# Patient Record
Sex: FEMALE | Race: WHITE | NOT HISPANIC OR LATINO | Employment: UNEMPLOYED | URBAN - METROPOLITAN AREA
[De-identification: names, ages, dates, MRNs, and addresses within clinical notes are randomized per-mention and may not be internally consistent; named-entity substitution may affect disease eponyms.]

---

## 2017-03-20 ENCOUNTER — HISTORICAL (OUTPATIENT)
Dept: ADMINISTRATIVE | Facility: HOSPITAL | Age: 32
End: 2017-03-20

## 2019-09-15 ENCOUNTER — OFFICE VISIT (OUTPATIENT)
Dept: URGENT CARE | Facility: CLINIC | Age: 34
End: 2019-09-15
Payer: MEDICAID

## 2019-09-15 VITALS
DIASTOLIC BLOOD PRESSURE: 68 MMHG | HEIGHT: 64 IN | SYSTOLIC BLOOD PRESSURE: 110 MMHG | BODY MASS INDEX: 30.87 KG/M2 | HEART RATE: 68 BPM | RESPIRATION RATE: 18 BRPM | WEIGHT: 180.81 LBS | TEMPERATURE: 97 F

## 2019-09-15 DIAGNOSIS — S30.814A ABRASION OF VAGINA, INITIAL ENCOUNTER: Primary | ICD-10-CM

## 2019-09-15 LAB
B-HCG UR QL: NEGATIVE
BILIRUB SERPL-MCNC: NEGATIVE MG/DL
BLOOD URINE, POC: NEGATIVE
COLOR, POC UA: NORMAL
CTP QC/QA: YES
GLUCOSE UR QL STRIP: NEGATIVE
KETONES UR QL STRIP: NEGATIVE
LEUKOCYTE ESTERASE URINE, POC: NEGATIVE
NITRITE, POC UA: NEGATIVE
PH, POC UA: 5
PROTEIN, POC: NEGATIVE
SPECIFIC GRAVITY, POC UA: 1.01
UROBILINOGEN, POC UA: NORMAL

## 2019-09-15 PROCEDURE — 99204 PR OFFICE/OUTPT VISIT, NEW, LEVL IV, 45-59 MIN: ICD-10-PCS | Mod: 25,S$GLB,, | Performed by: NURSE PRACTITIONER

## 2019-09-15 PROCEDURE — 81003 PR URINALYSIS, AUTO, W/O SCOPE: ICD-10-PCS | Mod: QW,S$GLB,, | Performed by: NURSE PRACTITIONER

## 2019-09-15 PROCEDURE — 99204 OFFICE O/P NEW MOD 45 MIN: CPT | Mod: 25,S$GLB,, | Performed by: NURSE PRACTITIONER

## 2019-09-15 PROCEDURE — 81025 POCT URINE PREGNANCY: ICD-10-PCS | Mod: S$GLB,,, | Performed by: NURSE PRACTITIONER

## 2019-09-15 PROCEDURE — 81025 URINE PREGNANCY TEST: CPT | Mod: S$GLB,,, | Performed by: NURSE PRACTITIONER

## 2019-09-15 PROCEDURE — 81003 URINALYSIS AUTO W/O SCOPE: CPT | Mod: QW,S$GLB,, | Performed by: NURSE PRACTITIONER

## 2019-09-15 RX ORDER — 1.1% SODIUM FLUORIDE PRESCRIPTION DENTAL CREAM 5 MG/G
CREAM DENTAL
Refills: 0 | COMMUNITY
Start: 2019-08-26

## 2019-09-15 RX ORDER — SULFAMETHOXAZOLE AND TRIMETHOPRIM 800; 160 MG/1; MG/1
1 TABLET ORAL 2 TIMES DAILY
Qty: 20 TABLET | Refills: 0 | Status: SHIPPED | OUTPATIENT
Start: 2019-09-15 | End: 2023-10-10

## 2019-09-15 RX ORDER — MUPIROCIN 20 MG/G
OINTMENT TOPICAL
Qty: 22 G | Refills: 1 | OUTPATIENT
Start: 2019-09-15 | End: 2023-04-29

## 2019-09-15 NOTE — PATIENT INSTRUCTIONS
Abrasions  Abrasions are skin scrapes. Their treatment depends on how large and deep the abrasion is.  Home care  You may be prescribed an antibiotic cream or ointment to apply to the wound. This helps prevent infection. Follow instructions when using this medicine.  General care  · To care for the abrasion, do the following each day for as long as directed by your healthcare provider.  ¨ If you were given a bandage, change it once a day. If your bandage sticks to the wound, soak it in warm water until it loosens.  ¨ Wash the area with soap and warm water. You may do this in a sink or under a tub faucet or shower. Rinse off the soap. Then pat the area dry with a clean towel.  ¨ If antibiotic ointment or cream was prescribed, reapply it to the wound as directed. Cover the wound with a fresh nonstick bandage. If the bandage becomes wet or dirty, change it as soon as possible.  ¨ Some antibiotic ointments or cream can cause an allergic reaction or dermatitis. This may cause redness, itching and or hives. If this occurs, stop using the ointment immediately and wash off any remaining ointment. You may need to take some allergy medicine to relieve symptoms.  · You may use acetaminophen or ibuprofen to control pain unless another pain medicine was prescribed. Talk with your healthcare provider before using these medicines if you have chronic liver or kidney disease or ever had a stomach ulcer or GI bleeding. Dont use ibuprofen in children younger than six months old.  · Most skin wounds heal within 10 days. But an infection may occur even with treatment. So its important to watch the wound for signs of infection as listed below.  Follow-up care  Follow up with your healthcare provider, or as advised.  When to seek medical advice  Call your healthcare provider right away if any of these occur:  · Fever of 100.4ºF (38ºC) or higher, or as directed by your healthcare provider  · Increasing pain, redness, swelling, or  drainage from the wound  · Bleeding from the wound that does not stop after a few minutes of steady, firm pressure  · Decreased ability to move any body part near the wound  Date Last Reviewed: 3/3/2017  © 2557-3537 [employer] [address] [place] [address], [place] [address]. All rights reserved. This information is not intended as a substitute for professional medical care. Always follow your healthcare professional's instructions.

## 2019-09-15 NOTE — PROGRESS NOTES
"Subjective:       Patient ID: Lakshmi Rodriguez is a 34 y.o. female.    Vitals:  height is 5' 4" (1.626 m) and weight is 82 kg (180 lb 12.8 oz). Her oral temperature is 97.2 °F (36.2 °C). Her blood pressure is 110/68 and her pulse is 68. Her respiration is 18.     Chief Complaint: Sore (sores in groin area) and Urinary Frequency    Pt presents with pain and lesion to vaginal area. Pt states for the past 3 days she has been experiencing pain with lesion to right vaginal area. She reports shaving area prior to symptoms starting. Pt is sexually active with only1 partner and denies concern for STD. Pt denies vaginal discharge. Pt reports pain to area with urination. Pt denies f/c/n/v.       Constitution: Negative for chills, fatigue and fever.   HENT: Negative for congestion and sore throat.    Neck: Negative for painful lymph nodes.   Cardiovascular: Negative for chest pain and leg swelling.   Eyes: Negative for double vision and blurred vision.   Respiratory: Negative for cough and shortness of breath.    Gastrointestinal: Negative for nausea, vomiting and diarrhea.   Genitourinary: Positive for vaginal pain and genital sore. Negative for dysuria, frequency, urgency, history of kidney stones, vaginal discharge, vaginal bleeding, vaginal odor, painful intercourse, painful ejaculation and pelvic pain.   Musculoskeletal: Negative for joint pain, joint swelling, muscle cramps and muscle ache.   Skin: Negative for color change, pale, rash and bruising.   Allergic/Immunologic: Negative for seasonal allergies.   Neurological: Negative for dizziness, history of vertigo, light-headedness, passing out and headaches.   Hematologic/Lymphatic: Negative for swollen lymph nodes.   Psychiatric/Behavioral: Negative for nervous/anxious, sleep disturbance and depression. The patient is not nervous/anxious.        Objective:      Physical Exam   Constitutional: She is oriented to person, place, and time. She appears well-developed and " well-nourished. She is cooperative.  Non-toxic appearance. She does not appear ill. No distress.   HENT:   Head: Normocephalic and atraumatic.   Right Ear: Hearing, tympanic membrane, external ear and ear canal normal.   Left Ear: Hearing, tympanic membrane, external ear and ear canal normal.   Nose: Nose normal. No mucosal edema, rhinorrhea or nasal deformity. No epistaxis. Right sinus exhibits no maxillary sinus tenderness and no frontal sinus tenderness. Left sinus exhibits no maxillary sinus tenderness and no frontal sinus tenderness.   Mouth/Throat: Uvula is midline, oropharynx is clear and moist and mucous membranes are normal. No trismus in the jaw. Normal dentition. No uvula swelling. No posterior oropharyngeal erythema.   Eyes: Conjunctivae and lids are normal. Right eye exhibits no discharge. Left eye exhibits no discharge. No scleral icterus.   Sclera clear bilat   Neck: Trachea normal, normal range of motion, full passive range of motion without pain and phonation normal. Neck supple.   Cardiovascular: Normal rate, regular rhythm, normal heart sounds, intact distal pulses and normal pulses.   Pulmonary/Chest: Effort normal and breath sounds normal. No respiratory distress.   Abdominal: Soft. Normal appearance and bowel sounds are normal. She exhibits no distension, no pulsatile midline mass and no mass. There is no tenderness.   Genitourinary: No vaginal discharge found.   Genitourinary Comments: 2 small superficial wounds to right labia majora with mild erythema. No herpetic lesion.    Musculoskeletal: Normal range of motion. She exhibits no edema or deformity.   Neurological: She is alert and oriented to person, place, and time. She exhibits normal muscle tone. Coordination normal.   Skin: Skin is warm, dry and intact. She is not diaphoretic. No pallor.   Psychiatric: She has a normal mood and affect. Her speech is normal and behavior is normal. Judgment and thought content normal. Cognition and memory  are normal.   Nursing note and vitals reviewed.      Assessment:       1. Abrasion of vagina, initial encounter        Plan:         Abrasion of vagina, initial encounter  -     POCT URINE DIPSTICK WITHOUT MICROSCOPE  -     POCT urine pregnancy    Other orders  -     sulfamethoxazole-trimethoprim 800-160mg (BACTRIM DS) 800-160 mg Tab; Take 1 tablet by mouth 2 (two) times daily.  Dispense: 20 tablet; Refill: 0  -     mupirocin (BACTROBAN) 2 % ointment; Apply to affected area 3 times daily  Dispense: 22 g; Refill: 1

## 2019-12-09 ENCOUNTER — HOSPITAL ENCOUNTER (EMERGENCY)
Facility: HOSPITAL | Age: 34
Discharge: HOME | End: 2019-12-09
Attending: EMERGENCY MEDICINE
Payer: MEDICAID

## 2019-12-09 VITALS
TEMPERATURE: 98 F | BODY MASS INDEX: 27.32 KG/M2 | WEIGHT: 164 LBS | SYSTOLIC BLOOD PRESSURE: 118 MMHG | OXYGEN SATURATION: 98 % | HEIGHT: 65 IN | RESPIRATION RATE: 17 BRPM | DIASTOLIC BLOOD PRESSURE: 72 MMHG | HEART RATE: 84 BPM

## 2019-12-09 DIAGNOSIS — M25.532 LEFT WRIST PAIN: Primary | ICD-10-CM

## 2019-12-09 DIAGNOSIS — R52 PAIN: ICD-10-CM

## 2019-12-09 LAB
B-HCG UR QL: NEGATIVE
CTP QC/QA: YES

## 2019-12-09 PROCEDURE — 99283 EMERGENCY DEPT VISIT LOW MDM: CPT | Mod: 25

## 2019-12-09 PROCEDURE — 81025 URINE PREGNANCY TEST: CPT | Performed by: NURSE PRACTITIONER

## 2019-12-09 RX ORDER — DICLOFENAC SODIUM 50 MG/1
50 TABLET, DELAYED RELEASE ORAL 3 TIMES DAILY PRN
Qty: 20 TABLET | Refills: 2 | Status: SHIPPED | OUTPATIENT
Start: 2019-12-09

## 2019-12-09 NOTE — ED PROVIDER NOTES
Encounter Date: 12/9/2019       History     Chief Complaint   Patient presents with    Hand Pain     Left     Left wrist pain with mild swelling  ONset PTA  Pt was at work picked something up and felt a pop  NOw has swelling in this area        Review of patient's allergies indicates:  No Known Allergies  Past Medical History:   Diagnosis Date    Seizures     epilepsy absent seizures     Past Surgical History:   Procedure Laterality Date    EAR RECONSTRUCTION      bilateral ears, r/t birth defect     Family History   Problem Relation Age of Onset    Diabetes Maternal Grandmother     Heart disease Maternal Grandmother     Mental retardation Cousin      Social History     Tobacco Use    Smoking status: Never Smoker   Substance Use Topics    Alcohol use: No     Comment: social drinker when not pregnant    Drug use: No     Review of Systems   Constitutional: Negative for fever.   Respiratory: Negative for cough, shortness of breath and wheezing.    Cardiovascular: Negative for chest pain, palpitations and leg swelling.   Gastrointestinal: Negative for abdominal pain, diarrhea, nausea and vomiting.   Genitourinary: Negative for dysuria.   Musculoskeletal: Negative for back pain.        Left wrist pain and swelling   Skin: Negative for rash.   Neurological: Negative for weakness.       Physical Exam     Initial Vitals [12/09/19 1007]   BP Pulse Resp Temp SpO2   128/72 82 16 98.4 °F (36.9 °C) 98 %      MAP       --         Physical Exam    Constitutional: She appears well-developed and well-nourished.   HENT:   Head: Normocephalic and atraumatic.   Eyes: Conjunctivae are normal.   Neck: Normal range of motion.   Cardiovascular: Normal rate and regular rhythm.   Pulmonary/Chest: Breath sounds normal. No respiratory distress.   Musculoskeletal: Normal range of motion.   Left wrist with area of mild swelling and tenderness.  Has full ROM all extremities.     Neurological: She is alert and oriented to person, place,  and time. No sensory deficit. GCS score is 15. GCS eye subscore is 4. GCS verbal subscore is 5. GCS motor subscore is 6.   Skin: Skin is warm and dry.   Psychiatric: She has a normal mood and affect. Thought content normal.         ED Course   Splint Application  Date/Time: 12/9/2019 11:12 AM  Performed by: Sandy Paredes NP  Authorized by: Kimi Spencer MD   Location details: left wrist  Supplies used: elastic bandage  Post-procedure: The splinted body part was neurovascularly unchanged following the procedure.  Patient tolerance: Patient tolerated the procedure well with no immediate complications  Comments: Wrist splint applied        Labs Reviewed   POCT URINE PREGNANCY          Imaging Results          X-Ray Wrist Complete Left (Final result)  Result time 12/09/19 10:27:40    Final result by Kennedy Jones IV, MD (12/09/19 10:27:40)                 Impression:      No acute osseous abnormality.      Electronically signed by: Kennedy Jones IV., MD  Date:    12/09/2019  Time:    10:27             Narrative:    EXAMINATION:  XR WRIST COMPLETE 3 VIEWS LEFT    CLINICAL HISTORY:  Pain, unspecified    COMPARISON:  None.    FINDINGS:  The bones are appropriately mineralized.  No acute fracture, dislocation or osseous destruction is observed. The joint are spaces appropriately maintained.  No focal soft tissue abnormality is identified.                                 Medical Decision Making:   Initial Assessment:   Left wrist pain and swelling  Have discussed this pt with DR. Spencer                                 Clinical Impression:       ICD-10-CM ICD-9-CM   1. Left wrist pain M25.532 719.43   2. Pain R52 780.96                             Sandy Paredes NP  12/09/19 1113

## 2020-03-06 DIAGNOSIS — R56.9 SEIZURE: Primary | ICD-10-CM

## 2020-03-09 ENCOUNTER — HOSPITAL ENCOUNTER (OUTPATIENT)
Dept: RADIOLOGY | Facility: HOSPITAL | Age: 35
Discharge: HOME | End: 2020-03-09
Attending: CLINICAL NURSE SPECIALIST
Payer: MEDICAID

## 2020-03-09 DIAGNOSIS — R56.9 SEIZURE: ICD-10-CM

## 2020-03-09 PROCEDURE — 70551 MRI BRAIN STEM W/O DYE: CPT | Mod: TC,PO

## 2020-05-18 ENCOUNTER — OFFICE VISIT (OUTPATIENT)
Dept: PRIMARY CARE CLINIC | Facility: CLINIC | Age: 35
End: 2020-05-18
Payer: MEDICAID

## 2020-05-18 VITALS
TEMPERATURE: 98 F | DIASTOLIC BLOOD PRESSURE: 68 MMHG | OXYGEN SATURATION: 100 % | HEART RATE: 81 BPM | RESPIRATION RATE: 18 BRPM | SYSTOLIC BLOOD PRESSURE: 114 MMHG

## 2020-05-18 DIAGNOSIS — U07.1 COVID-19: Primary | ICD-10-CM

## 2020-05-18 PROCEDURE — 99213 OFFICE O/P EST LOW 20 MIN: CPT | Mod: S$GLB,,, | Performed by: EMERGENCY MEDICINE

## 2020-05-18 PROCEDURE — U0003 INFECTIOUS AGENT DETECTION BY NUCLEIC ACID (DNA OR RNA); SEVERE ACUTE RESPIRATORY SYNDROME CORONAVIRUS 2 (SARS-COV-2) (CORONAVIRUS DISEASE [COVID-19]), AMPLIFIED PROBE TECHNIQUE, MAKING USE OF HIGH THROUGHPUT TECHNOLOGIES AS DESCRIBED BY CMS-2020-01-R: HCPCS

## 2020-05-18 PROCEDURE — 99213 PR OFFICE/OUTPT VISIT, EST, LEVL III, 20-29 MIN: ICD-10-PCS | Mod: S$GLB,,, | Performed by: EMERGENCY MEDICINE

## 2020-05-18 NOTE — PROGRESS NOTES
Subjective:        Time seen by provider: 10:09 AM on 05/18/2020    Lakshmi Rodriguez is a 35 y.o. female with history of seizures  who presents for an evaluation of possible COVID-19. She is asymptomatic but states her mother, who is scheduled to have surgery secondary to stage III colon CA, will become her primary care giver and was advised to be tested. The patient denies any symptoms at this time. No pertinent PSHx.    Review of Systems   Constitutional: Negative for activity change, appetite change, fatigue and fever.   HENT: Negative for congestion, rhinorrhea and sore throat.    Respiratory: Negative for cough, chest tightness, shortness of breath and wheezing.    Cardiovascular: Negative for chest pain and palpitations.   Gastrointestinal: Negative for diarrhea, nausea and vomiting.   Musculoskeletal: Negative for arthralgias and myalgias.   Skin: Negative for rash.   Neurological: Negative for weakness, light-headedness, numbness and headaches.       Objective:      Physical Exam   Constitutional: She is oriented to person, place, and time. She appears well-developed and well-nourished. No distress.   HENT:   Head: Normocephalic and atraumatic.   Nose: Nose normal.   Mouth/Throat: Oropharynx is clear and moist.   Eyes: Conjunctivae are normal.   Neck: Normal range of motion.   Cardiovascular: Normal rate, regular rhythm, normal heart sounds and intact distal pulses.   No murmur heard.  Pulmonary/Chest: Breath sounds normal. No respiratory distress. She has no wheezes.   Musculoskeletal: Normal range of motion.   Neurological: She is alert and oriented to person, place, and time.   Skin: Skin is warm and dry. She is not diaphoretic.   Nursing note and vitals reviewed.      Assessment and Plan:      Diagnoses and all orders for this visit:    COVID-19  -     COVID-19 Routine Screening  - Discharge home and await results.   - Return to clinic or ED for new or worsening symptoms.   - Follow-up with PCP as needed.      Scribe Attestation:   I, Angeles Hou, am scribing for, and in the presence of, Kecia Acosta PA-C. I performed the above scribed service and the documentation accurately describes the services I performed. I attest to the accuracy of the note.    I, Kecia Acosta PA-C, personally performed the services described in this documentation. All medical record entries made by the scribe were at my direction and in my presence.  I have reviewed the chart and agree that the record reflects my personal performance and is accurate and complete. Kecia Acosta PA-C.  1:59 PM 05/18/2020

## 2020-05-18 NOTE — PATIENT INSTRUCTIONS
Instructions for Patients with Confirmed or Suspected COVID-19    If you are awaiting your test result, you will either be called or it will be released to the patient portal.  If you have any questions about your test, please visit [email or web address] or call our COVID-19 information line at [phone].       Stay home and stay away from family members and friends. The CDC says, you can leave home after these three things have happened: 1) You have had no fever for at least 72 hours (that is three full days of no fever without the use of medicine that reduces fevers) 2) AND other symptoms have improved (for example, when your cough or shortness of breath have improved) 3) AND at least 7 days have passed since your symptoms first appeared.   Separate yourself from other people and animals in your home.   Call ahead before visiting your doctor.   Wear a facemask.   Cover your coughs and sneezes.   Wash your hands often with soap and water; hand  can be used, too.   Avoid sharing personal household items.   Wipe down surfaces used daily.   Monitor your symptoms. Seek prompt medical attention if your illness is worsening (e.g., difficulty breathing).    Before seeking care, call your healthcare provider.   If you have a medical emergency and need to call 911, notify the dispatch personnel that you have, or are being evaluated for COVID-19. If possible, put on a facemask before emergency medical services arrive.        Recommended precautions for household members, intimate partners, and caregivers in a home setting of a patient with symptomatic laboratory-confirmed COVID-19 or a patient under investigation.  Household members, intimate partners, and caregivers in the home setting awaiting tests results have close contact with a person with symptomatic, laboratory-confirmed COVID-19 or a person under investigation. Close contacts should monitor their health; they should call their  provider right away if they develop symptoms suggestive of COVID-19 (e.g., fever, cough, shortness of breath).    Close contacts should also follow these recommendations:   Make sure that you understand and can help the patient follow their provider's instructions for medication(s) and care. You should help the patient with basic needs in the home and provide support for getting groceries, prescriptions, and other personal needs.   Monitor the patient's symptoms. If the patient is getting sicker, call his or her healthcare provider and tell them that the patient has laboratory-confirmed COVID-19. If the patient has a medical emergency and you need to call 911, notify the dispatch personnel that the patient has, or is being evaluated for COVID-19.   Household members should stay in another room or be  from the patient. Household members should use a separate bedroom and bathroom, if available.   Prohibit visitors.   Household members should care for any pets in the home.   Make sure that shared spaces in the home have good air flow, such as by an air conditioner or an opened window, weather permitting.   Perform hand hygiene frequently. Wash your hands often with soap and water for at least 20 seconds or use an alcohol-based hand  (that contains > 60% alcohol) covering all surfaces of your hands and rubbing them together until they feel dry. Soap and water should be used preferentially.   Avoid touching your eyes, nose, and mouth.   The patient should wear a facemask. If the patient is not able to wear a facemask (for example, because it causes trouble breathing), caregivers should wear a mask when they are in the same room as the patient.   Wear a disposable facemask and gloves when you touch or have contact with the patient's blood, stool, or body fluids, such as saliva, sputum, nasal mucus, vomit, urine.  o Throw out disposable facemasks and gloves after using them. Do not  reuse.  o When removing personal protective equipment, first remove and dispose of gloves. Then, immediately clean your hands with soap and water or alcohol-based hand . Next, remove and dispose of facemask, and immediately clean your hands again with soap and water or alcohol-based hand .   You should not share dishes, drinking glasses, cups, eating utensils, towels, bedding, or other items with the patient. After the patient uses these items, you should wash them thoroughly (see below Wash laundry thoroughly).   Clean all high-touch surfaces, such as counters, tabletops, doorknobs, bathroom fixtures, toilets, phones, keyboards, tablets, and bedside tables, every day. Also, clean any surfaces that may have blood, stool, or body fluids on them.   Use a household cleaning spray or wipe, according to the label instructions. Labels contain instructions for safe and effective use of the cleaning product including precautions you should take when applying the product, such as wearing gloves and making sure you have good ventilation during use of the product.   Wash laundry thoroughly.  o Immediately remove and wash clothes or bedding that have blood, stool, or body fluids on them.  o Wear disposable gloves while handling soiled items and keep soiled items away from your body. Clean your hands (with soap and water or an alcohol-based hand ) immediately after removing your gloves.  o Read and follow directions on labels of laundry or clothing items and detergent. In general, using a normal laundry detergent according to washing machine instructions and dry thoroughly using the warmest temperatures recommended on the clothing label.   Place all used disposable gloves, facemasks, and other contaminated items in a lined container before disposing of them with other household waste. Clean your hands (with soap and water or an alcohol-based hand ) immediately after handling these  items. Soap and water should be used preferentially if hands are visibly dirty.   Discuss any additional questions with your state or local health department or healthcare provider. Check available hours when contacting your local health department.    For more information see CDC link below.      https://www.cdc.gov/coronavirus/2019-ncov/hcp/guidance-prevent-spread.html#precautions        Sources:  [facility], [facility] and hospitals

## 2020-05-19 LAB — SARS-COV-2 RNA RESP QL NAA+PROBE: DETECTED

## 2020-06-04 ENCOUNTER — PATIENT MESSAGE (OUTPATIENT)
Dept: RESEARCH | Facility: HOSPITAL | Age: 35
End: 2020-06-04

## 2020-06-09 ENCOUNTER — OFFICE VISIT (OUTPATIENT)
Dept: PRIMARY CARE CLINIC | Facility: CLINIC | Age: 35
End: 2020-06-09
Payer: MEDICAID

## 2020-06-09 VITALS
SYSTOLIC BLOOD PRESSURE: 127 MMHG | OXYGEN SATURATION: 99 % | TEMPERATURE: 98 F | RESPIRATION RATE: 20 BRPM | HEART RATE: 89 BPM | DIASTOLIC BLOOD PRESSURE: 82 MMHG

## 2020-06-09 DIAGNOSIS — Z20.822 SUSPECTED COVID-19 VIRUS INFECTION: ICD-10-CM

## 2020-06-09 PROCEDURE — 99213 OFFICE O/P EST LOW 20 MIN: CPT | Mod: S$GLB,,, | Performed by: NURSE PRACTITIONER

## 2020-06-09 PROCEDURE — 99213 PR OFFICE/OUTPT VISIT, EST, LEVL III, 20-29 MIN: ICD-10-PCS | Mod: S$GLB,,, | Performed by: NURSE PRACTITIONER

## 2020-06-09 PROCEDURE — U0003 INFECTIOUS AGENT DETECTION BY NUCLEIC ACID (DNA OR RNA); SEVERE ACUTE RESPIRATORY SYNDROME CORONAVIRUS 2 (SARS-COV-2) (CORONAVIRUS DISEASE [COVID-19]), AMPLIFIED PROBE TECHNIQUE, MAKING USE OF HIGH THROUGHPUT TECHNOLOGIES AS DESCRIBED BY CMS-2020-01-R: HCPCS

## 2020-06-09 NOTE — PATIENT INSTRUCTIONS
Instructions for Patients with Confirmed or Suspected COVID-19    If you are awaiting your test result, you will either be called or it will be released to the patient portal.  If you have any questions about your test, please visit [email or web address] or call our COVID-19 information line at [phone].      Preventing the Spread of Coronavirus Disease 2019 (COVID-19) in Homes and Residential Communities -- Patients     Prevention steps for people with confirmed or suspected COVID-19 (including persons under investigation) who do not need to be hospitalized and people with confirmed COVID-19 who were hospitalized and determined to be medically stable to go home.    Stay home except to get medical care.    Separate yourself from other people and animals in your home.    Call ahead before visiting your doctor.    Wear a face mask.    Cover your coughs and sneezes.    Clean your hands often.    Avoid sharing personal household items.    Clean all high-touch surfaces every day.    Monitor your symptoms. Seek prompt medical attention if your illness is worsening (e.g., difficulty breathing). Before seeking care, call your healthcare provider.    If you have a medical emergency and must call 911, notify the dispatcher that you have or are being evaluated for COVID-19. If possible, put on a face mask before emergency medical services arrive.    Use the following symptom-based strategy to return to normal activity following a suspected or confirmed case of COVID-19. Continue isolation until:   o At least 3 days (72 hours) have passed since recovery defined as resolution of fever without the use of fever-reducing medications and improvement in respiratory symptoms (e.g. cough, shortness of breath), and   o At least 10 days have passed since symptoms first appeared.     Precautions for household members, intimate partners and caregivers in a non-healthcare setting of a patient with symptomatic  laboratory-confirmed COVID-19 or a patient under investigation.     Household members, intimate partners and caregivers in a non-healthcare setting may have close contact with a person with symptomatic, laboratory-confirmed COVID-19 or a person under investigation. Close contacts should monitor their health; they should call their healthcare provider right away if they develop symptoms suggestive of COVID-19 (e.g., fever, cough, shortness of breath). Close contacts should also follow these recommendations:      Make sure that you understand and can help the patient follow their healthcare providers instructions for medication(s) and care. You should help the patient with basic needs in the home and provide support for getting groceries, prescriptions, and other personal needs.    Monitor the patients symptoms. If the patient is getting sicker, call his or her healthcare provider and tell them that the patient has laboratory-confirmed COVID-19. This will help the healthcare providers office take steps to keep people in the office or waiting room from getting infected. Ask the healthcare provider to call the local or [facility] health department for additional guidance. If the patient has a medical emergency and you need to call 911, notify the dispatch personnel that the patient has or is being evaluated for COVID-19.    Household members should stay in another room or be  from the patient as much as possible. Household members should use a separate bedroom and bathroom, if available.    Prohibit visitors who do not have an essential need to be in the home.    Household members should care for any pets. Do not handle pets or other animals while sick.    Make sure that shared spaces in the home have good air flow, such as by an air conditioner.    Perform hand hygiene frequently. Wash your hands often with soap and water for at least 20 seconds or use an alcohol-based hand  that contains 60 to  95% alcohol, covering all surfaces of your hands and rubbing them together until they feel dry. Soap and water are preferred if hands are visibly dirty.    Avoid touching your eyes, nose and mouth with unwashed hands.    The patient should wear a face mask when you are around other people. If the patient is not able to wear a face mask (for example, because it causes trouble breathing), you, as the caregiver, should wear a mask when you are in the same room as the patient.    Wear a disposable face mask and gloves when you touch or have contact with the patients blood, stool or body fluids, such as saliva, sputum, nasal mucus, vomit and urine.   o Throw out disposable face masks and gloves after using them. Do not reuse.   o When removing personal protective equipment, first remove and dispose of gloves. Then, immediately clean your hands with soap and water or alcohol-based hand . Next, remove and dispose of face mask, and immediately clean your hands again with soap and water or alcohol-based hand .    Avoid sharing household items with the patient. You should not share dishes, drinking glasses, cups, eating utensils, towels, bedding or other items. After the patient uses these items, you should wash them thoroughly (see below Wash laundry thoroughly).    Clean all high-touch surfaces, such as counters, tabletops, doorknobs, bathroom fixtures, toilets, phones, keyboards, tablets and bedside tables, every day. Also, clean any surfaces that may have blood, stool or body fluids on them.   o Use a household cleaning spray or wipe, according to the label instructions. Labels contain instructions for safe and effective use of the cleaning product including precautions you should take when applying the product, such as wearing gloves and making sure you have good ventilation during use of the product.    Wash laundry thoroughly.   o Immediately remove and wash clothes or bedding that have  blood, stool or body fluids on them.  o Wear disposable gloves while handling soiled items and keep soiled items away from your body. Clean your hands (with soap and water or an alcohol-based hand ) immediately after removing your gloves.   o Read and follow directions on labels of laundry or clothing items and detergent. In general, using a normal laundry detergent according to washing machine instructions and dry thoroughly using the warmest temperatures recommended on the clothing label.    Place all used disposable gloves, face masks and other contaminated items in a lined container before disposing of them with other household waste. Clean your hands (with soap and water or an alcohol-based hand ) immediately after handling these items. Soap and water should be used preferentially if hands are visibly dirty.   Discuss any additional questions with your state or local health department

## 2020-06-09 NOTE — PROGRESS NOTES
Subjective:        Time seen by provider: 9:42 AM on 06/09/2020    Lakshmi Rodriguez is a 35 y.o. female with epilepsy who presents for an evaluation of possible COVID-19. She is asymptomatic but would like to be tested again after being found positive 3 weeks ago. At the time, the patient endorsed symptoms including a fever, cough, and loss of smell & taste 3 days. Her symptoms have since resolved. The patient denies being pregnant or any other symptoms at this time. No pulmonary PSHx.     Review of Systems   Constitutional: Negative for activity change, appetite change, fatigue and fever.   HENT: Negative for congestion, rhinorrhea and sore throat.    Respiratory: Negative for cough, chest tightness, shortness of breath and wheezing.    Cardiovascular: Negative for chest pain and palpitations.   Gastrointestinal: Negative for diarrhea, nausea and vomiting.   Musculoskeletal: Negative for arthralgias and myalgias.   Skin: Negative for rash.   Neurological: Negative for weakness, light-headedness, numbness and headaches.       Objective:      Physical Exam   Constitutional: She is oriented to person, place, and time. She appears well-developed and well-nourished. No distress.   HENT:   Head: Normocephalic and atraumatic.   Nose: Nose normal.   Mouth/Throat: Oropharynx is clear and moist.   Eyes: Conjunctivae are normal.   Neck: Normal range of motion.   Cardiovascular: Normal rate, regular rhythm, normal heart sounds and intact distal pulses.   No murmur heard.  Pulmonary/Chest: Breath sounds normal. No respiratory distress. She has no wheezes.   Musculoskeletal: Normal range of motion.   Neurological: She is alert and oriented to person, place, and time.   Skin: Skin is warm and dry. She is not diaphoretic.   Nursing note and vitals reviewed.      Assessment:   COVID - 19 Viral Infection  Plan:       Covid - 19 viral infection  - appears resolved.  2. Discharge home and await results.   3. Return to clinic or ED for  new or worsening symptoms.   4. Follow-up with PCP as needed.     Scribe Attestation:   I, Angeles Hou, am scribing for, and in the presence of, JUSTIN Chase. I performed the above scribed service and the documentation accurately describes the services I performed. I attest to the accuracy of the note.  I, JUSTIN Chase, personally performed the services described in this documentation. All medical record entries made by the scribe were at my direction and in my presence.  I have reviewed the chart and agree that the record reflects my personal performance and is accurate and complete. JUSTIN Chase.  9:52 AM 06/09/2020

## 2020-06-10 LAB — SARS-COV-2 RNA RESP QL NAA+PROBE: NOT DETECTED

## 2022-01-02 ENCOUNTER — HOSPITAL ENCOUNTER (EMERGENCY)
Facility: HOSPITAL | Age: 37
Discharge: HOME | End: 2022-01-02
Attending: EMERGENCY MEDICINE
Payer: MEDICAID

## 2022-01-02 ENCOUNTER — PATIENT MESSAGE (OUTPATIENT)
Dept: ADMINISTRATIVE | Facility: OTHER | Age: 37
End: 2022-01-02
Payer: MEDICAID

## 2022-01-02 VITALS
BODY MASS INDEX: 27.49 KG/M2 | WEIGHT: 161 LBS | OXYGEN SATURATION: 100 % | SYSTOLIC BLOOD PRESSURE: 120 MMHG | RESPIRATION RATE: 18 BRPM | HEIGHT: 64 IN | DIASTOLIC BLOOD PRESSURE: 73 MMHG | HEART RATE: 86 BPM | TEMPERATURE: 98 F

## 2022-01-02 DIAGNOSIS — N83.201 CYST OF RIGHT OVARY: Primary | ICD-10-CM

## 2022-01-02 LAB
ALBUMIN SERPL BCP-MCNC: 4.2 G/DL (ref 3.5–5.2)
ALP SERPL-CCNC: 57 U/L (ref 55–135)
ALT SERPL W/O P-5'-P-CCNC: 12 U/L (ref 10–44)
ANION GAP SERPL CALC-SCNC: 13 MMOL/L (ref 8–16)
AST SERPL-CCNC: 16 U/L (ref 10–40)
B-HCG UR QL: NEGATIVE
BASOPHILS # BLD AUTO: 0.04 K/UL (ref 0–0.2)
BASOPHILS NFR BLD: 0.3 % (ref 0–1.9)
BILIRUB SERPL-MCNC: 0.8 MG/DL (ref 0.1–1)
BILIRUB UR QL STRIP: NEGATIVE
BUN SERPL-MCNC: 12 MG/DL (ref 6–20)
CALCIUM SERPL-MCNC: 9.4 MG/DL (ref 8.7–10.5)
CHLORIDE SERPL-SCNC: 105 MMOL/L (ref 95–110)
CLARITY UR: CLEAR
CO2 SERPL-SCNC: 20 MMOL/L (ref 23–29)
COLOR UR: YELLOW
CREAT SERPL-MCNC: 0.7 MG/DL (ref 0.5–1.4)
CTP QC/QA: YES
DIFFERENTIAL METHOD: ABNORMAL
EOSINOPHIL # BLD AUTO: 0.1 K/UL (ref 0–0.5)
EOSINOPHIL NFR BLD: 0.5 % (ref 0–8)
ERYTHROCYTE [DISTWIDTH] IN BLOOD BY AUTOMATED COUNT: 12.2 % (ref 11.5–14.5)
EST. GFR  (AFRICAN AMERICAN): >60 ML/MIN/1.73 M^2
EST. GFR  (NON AFRICAN AMERICAN): >60 ML/MIN/1.73 M^2
GLUCOSE SERPL-MCNC: 98 MG/DL (ref 70–110)
GLUCOSE UR QL STRIP: NEGATIVE
HCT VFR BLD AUTO: 41.7 % (ref 37–48.5)
HGB BLD-MCNC: 14.2 G/DL (ref 12–16)
HGB UR QL STRIP: NEGATIVE
IMM GRANULOCYTES # BLD AUTO: 0.07 K/UL (ref 0–0.04)
IMM GRANULOCYTES NFR BLD AUTO: 0.5 % (ref 0–0.5)
KETONES UR QL STRIP: NEGATIVE
LACTATE SERPL-SCNC: 0.8 MMOL/L (ref 0.5–1.9)
LEUKOCYTE ESTERASE UR QL STRIP: NEGATIVE
LIPASE SERPL-CCNC: 35 U/L (ref 4–60)
LYMPHOCYTES # BLD AUTO: 1.7 K/UL (ref 1–4.8)
LYMPHOCYTES NFR BLD: 11.3 % (ref 18–48)
MCH RBC QN AUTO: 29.8 PG (ref 27–31)
MCHC RBC AUTO-ENTMCNC: 34.1 G/DL (ref 32–36)
MCV RBC AUTO: 87 FL (ref 82–98)
MONOCYTES # BLD AUTO: 1.2 K/UL (ref 0.3–1)
MONOCYTES NFR BLD: 8.2 % (ref 4–15)
NEUTROPHILS # BLD AUTO: 11.8 K/UL (ref 1.8–7.7)
NEUTROPHILS NFR BLD: 79.2 % (ref 38–73)
NITRITE UR QL STRIP: NEGATIVE
NRBC BLD-RTO: 0 /100 WBC
PH UR STRIP: 6 [PH] (ref 5–8)
PLATELET # BLD AUTO: 421 K/UL (ref 150–450)
PMV BLD AUTO: 10 FL (ref 9.2–12.9)
POTASSIUM SERPL-SCNC: 4.3 MMOL/L (ref 3.5–5.1)
PROT SERPL-MCNC: 7.9 G/DL (ref 6–8.4)
PROT UR QL STRIP: NEGATIVE
RBC # BLD AUTO: 4.77 M/UL (ref 4–5.4)
SARS-COV-2 RDRP RESP QL NAA+PROBE: NEGATIVE
SODIUM SERPL-SCNC: 138 MMOL/L (ref 136–145)
SP GR UR STRIP: 1.02 (ref 1–1.03)
URN SPEC COLLECT METH UR: NORMAL
UROBILINOGEN UR STRIP-ACNC: NEGATIVE EU/DL
WBC # BLD AUTO: 14.9 K/UL (ref 3.9–12.7)

## 2022-01-02 PROCEDURE — 81003 URINALYSIS AUTO W/O SCOPE: CPT | Performed by: NURSE PRACTITIONER

## 2022-01-02 PROCEDURE — 96375 TX/PRO/DX INJ NEW DRUG ADDON: CPT

## 2022-01-02 PROCEDURE — U0002 COVID-19 LAB TEST NON-CDC: HCPCS | Performed by: NURSE PRACTITIONER

## 2022-01-02 PROCEDURE — 80053 COMPREHEN METABOLIC PANEL: CPT | Performed by: NURSE PRACTITIONER

## 2022-01-02 PROCEDURE — 25000003 PHARM REV CODE 250: Performed by: EMERGENCY MEDICINE

## 2022-01-02 PROCEDURE — 96361 HYDRATE IV INFUSION ADD-ON: CPT

## 2022-01-02 PROCEDURE — 85025 COMPLETE CBC W/AUTO DIFF WBC: CPT | Performed by: NURSE PRACTITIONER

## 2022-01-02 PROCEDURE — 96366 THER/PROPH/DIAG IV INF ADDON: CPT

## 2022-01-02 PROCEDURE — 63600175 PHARM REV CODE 636 W HCPCS: Performed by: EMERGENCY MEDICINE

## 2022-01-02 PROCEDURE — 99285 EMERGENCY DEPT VISIT HI MDM: CPT | Mod: 25

## 2022-01-02 PROCEDURE — 25500020 PHARM REV CODE 255: Performed by: EMERGENCY MEDICINE

## 2022-01-02 PROCEDURE — 83605 ASSAY OF LACTIC ACID: CPT | Performed by: EMERGENCY MEDICINE

## 2022-01-02 PROCEDURE — 96365 THER/PROPH/DIAG IV INF INIT: CPT | Mod: 59

## 2022-01-02 PROCEDURE — 96374 THER/PROPH/DIAG INJ IV PUSH: CPT

## 2022-01-02 PROCEDURE — 81025 URINE PREGNANCY TEST: CPT | Performed by: NURSE PRACTITIONER

## 2022-01-02 PROCEDURE — 83690 ASSAY OF LIPASE: CPT | Performed by: NURSE PRACTITIONER

## 2022-01-02 PROCEDURE — C9113 INJ PANTOPRAZOLE SODIUM, VIA: HCPCS | Performed by: EMERGENCY MEDICINE

## 2022-01-02 RX ORDER — NAPROXEN 500 MG/1
500 TABLET ORAL 2 TIMES DAILY WITH MEALS
Qty: 30 TABLET | Refills: 0 | Status: SHIPPED | OUTPATIENT
Start: 2022-01-02 | End: 2023-10-10

## 2022-01-02 RX ORDER — SODIUM CHLORIDE 9 MG/ML
INJECTION, SOLUTION INTRAVENOUS
Status: COMPLETED | OUTPATIENT
Start: 2022-01-02 | End: 2022-01-02

## 2022-01-02 RX ORDER — PANTOPRAZOLE SODIUM 40 MG/10ML
40 INJECTION, POWDER, LYOPHILIZED, FOR SOLUTION INTRAVENOUS
Status: COMPLETED | OUTPATIENT
Start: 2022-01-02 | End: 2022-01-02

## 2022-01-02 RX ORDER — ONDANSETRON 4 MG/1
4 TABLET, FILM COATED ORAL EVERY 6 HOURS PRN
Qty: 10 TABLET | Refills: 0 | Status: SHIPPED | OUTPATIENT
Start: 2022-01-02 | End: 2022-01-06

## 2022-01-02 RX ORDER — ONDANSETRON 2 MG/ML
8 INJECTION INTRAMUSCULAR; INTRAVENOUS
Status: COMPLETED | OUTPATIENT
Start: 2022-01-02 | End: 2022-01-02

## 2022-01-02 RX ORDER — HYDROMORPHONE HYDROCHLORIDE 1 MG/ML
1 INJECTION, SOLUTION INTRAMUSCULAR; INTRAVENOUS; SUBCUTANEOUS
Status: COMPLETED | OUTPATIENT
Start: 2022-01-02 | End: 2022-01-02

## 2022-01-02 RX ADMIN — PANTOPRAZOLE SODIUM 40 MG: 40 INJECTION, POWDER, LYOPHILIZED, FOR SOLUTION INTRAVENOUS at 04:01

## 2022-01-02 RX ADMIN — ONDANSETRON 8 MG: 2 INJECTION INTRAMUSCULAR; INTRAVENOUS at 04:01

## 2022-01-02 RX ADMIN — PROMETHAZINE HYDROCHLORIDE 6.25 MG: 25 INJECTION INTRAMUSCULAR; INTRAVENOUS at 06:01

## 2022-01-02 RX ADMIN — SODIUM CHLORIDE, SODIUM LACTATE, POTASSIUM CHLORIDE, AND CALCIUM CHLORIDE 1000 ML: .6; .31; .03; .02 INJECTION, SOLUTION INTRAVENOUS at 04:01

## 2022-01-02 RX ADMIN — IOHEXOL 100 ML: 350 INJECTION, SOLUTION INTRAVENOUS at 05:01

## 2022-01-02 RX ADMIN — SODIUM CHLORIDE: 0.9 INJECTION, SOLUTION INTRAVENOUS at 06:01

## 2022-01-02 RX ADMIN — HYDROMORPHONE HYDROCHLORIDE 1 MG: 1 INJECTION, SOLUTION INTRAMUSCULAR; INTRAVENOUS; SUBCUTANEOUS at 04:01

## 2022-01-02 NOTE — ED NOTES
Pt returned from CT, pale, diaphoretic, and vomiting.  States she is very susceptible to pain meds.   Dr. Spencer notified.

## 2022-01-02 NOTE — ED PROVIDER NOTES
Encounter Date: 1/2/2022       History     Chief Complaint   Patient presents with    Abdominal Pain     Bilateral lower abdominal pain x 2 days, denies n/v/d      36-year-old female presented emergency department with 1 week of gradually worsening abdominal pain and over the past 2 days got worse.  Patient has 1 episode of diarrhea and has some nausea.  Denies dysuria or hematuria.  Denies any vaginal bleeding or discharge.  Denies flank pain.  Patient states she does not have an appetite.  Patient's pain now localized in the right lower abdomen.        Review of patient's allergies indicates:  No Known Allergies  Past Medical History:   Diagnosis Date    Seizures     epilepsy absent seizures     Past Surgical History:   Procedure Laterality Date    EAR RECONSTRUCTION      bilateral ears, r/t birth defect     Family History   Problem Relation Age of Onset    Diabetes Maternal Grandmother     Heart disease Maternal Grandmother     Mental retardation Cousin      Social History     Tobacco Use    Smoking status: Never Smoker   Substance Use Topics    Alcohol use: No     Comment: social drinker when not pregnant    Drug use: No     Review of Systems   Constitutional: Negative.    HENT: Negative.    Eyes: Negative.    Respiratory: Negative.    Cardiovascular: Negative.  Negative for chest pain.   Gastrointestinal: Positive for abdominal pain, diarrhea and nausea.   Endocrine: Negative.    Genitourinary: Negative.    Musculoskeletal: Negative.    Skin: Negative.    Allergic/Immunologic: Negative.    Neurological: Negative.    Hematological: Negative.    Psychiatric/Behavioral: Negative.    All other systems reviewed and are negative.      Physical Exam     Initial Vitals [01/02/22 1456]   BP Pulse Resp Temp SpO2   133/86 (!) 119 18 98.1 °F (36.7 °C) 97 %      MAP       --         Physical Exam    Nursing note and vitals reviewed.  Constitutional: She appears well-developed and well-nourished.   HENT:   Head:  Normocephalic and atraumatic.   Nose: Nose normal.   Mouth/Throat: Oropharynx is clear and moist.   Eyes: Conjunctivae and EOM are normal. Pupils are equal, round, and reactive to light.   Neck: Neck supple. No thyromegaly present. No tracheal deviation present. No JVD present.   Normal range of motion.  Cardiovascular: Regular rhythm, normal heart sounds and intact distal pulses.   No murmur heard.  Tachycardia   Pulmonary/Chest: Breath sounds normal. No stridor. No respiratory distress. She has no wheezes. She has no rales.   Abdominal: Abdomen is soft. Bowel sounds are normal. There is abdominal tenderness.   Right lower abdominal tenderness and guarding noted There is rebound and guarding.   Musculoskeletal:         General: No edema. Normal range of motion.      Cervical back: Normal range of motion and neck supple.     Neurological: She is alert and oriented to person, place, and time.   Skin: Skin is warm. Capillary refill takes less than 2 seconds.   Psychiatric: She has a normal mood and affect. Thought content normal.         ED Course   Procedures  Labs Reviewed   CBC W/ AUTO DIFFERENTIAL - Abnormal; Notable for the following components:       Result Value    WBC 14.90 (*)     Gran # (ANC) 11.8 (*)     Immature Grans (Abs) 0.07 (*)     Mono # 1.2 (*)     Gran % 79.2 (*)     Lymph % 11.3 (*)     All other components within normal limits    Narrative:     For upper or mid abdominal pain.   COMPREHENSIVE METABOLIC PANEL - Abnormal; Notable for the following components:    CO2 20 (*)     All other components within normal limits    Narrative:     For upper or mid abdominal pain.   URINALYSIS, REFLEX TO URINE CULTURE    Narrative:     In and Out Cath as needed it patient unable to void  Specimen Source->Urine   LIPASE    Narrative:     For upper or mid abdominal pain.   SARS-COV-2 RNA AMPLIFICATION, QUAL   LACTIC ACID, PLASMA   POCT URINE PREGNANCY          Imaging Results          US Pelvis Complete Non OB  (Final result)  Result time 01/02/22 19:15:48    Final result by Carmen Kaiser MD (01/02/22 19:15:48)                 Narrative:    Pelvic ultrasound    Clinical history is pelvic pain    The uterus measures 10.2 x 4.2 x 5.4 cm.    The endometrium measures 7 mm.    There is a 1.5 cm anterior fundal fibroid.    The right ovary measures 4.2 x 2.5 x 4.2 cm. There is a 2.5 x 2.7 x 1 2.1 cm complex right ovarian cyst suggestive of hemorrhagic cyst. The left ovary measures 3.0 x 1.6 x 3.2 cm. There is normal flow to both ovaries. There is no free fluid.    IMPRESSION: 2.5 x 2.7 cm complex right ovarian cyst compatible with hemorrhagic cyst    1.5 cm anterior fundal fibroid    Electronically signed by:  Carmen Kaiser MD  1/2/2022 7:15 PM CST Workstation: [id number]                             CT Abdomen Pelvis With Contrast (Edited Result - FINAL)  Result time 01/02/22 19:00:50    Edited Result - FINAL by Carmen Kaiser MD (01/02/22 19:00:50)                 Narrative:         ADDENDUM #1       The appendix is normal. There are no inflammatory changes in the right lower quadrant.    Electronically signed by:  Carmen Kaiser MD  1/2/2022 7:00 PM CST Workstation: [id number]     ORIGINAL REPORT       All CT scans at this facility used dose modulation, iterative reconstruction and/or weight-based dosing when appropriate to reduce radiation doses  as low as reasonably achievable.    HISTORY: Abdominal pain, acute, nonlocalized    FINDINGS: Axial postcontrast imaging was performed with 100 mL Omnipaque 350 IV contrast .    CT ABDOMEN: There are bilateral breast implants. The lung bases are clear.    The liver, spleen, pancreas, gallbladder and adrenal glands are normal.    The kidneys enhance symmetrically without hydronephrosis or calculi.    There are no thick-walled or dilated bowel loops. There is no mesenteric or retroperitoneal adenopathy. Aorta is normal in caliber.    CT PELVIS: There is a 2.3 cm  right ovarian cyst. The uterus, left ovary and bladder are normal. There is no adenopathy. There are no acute osseous abnormalities.    IMPRESSION: 2.3 cm right ovarian cyst otherwise negative CT abdomen and pelvis    Electronically signed by:  Carmen Kaiser MD  1/2/2022 5:23 PM CST Workstation: [id number]                  Final result by Carmen Kaiser MD (01/02/22 17:23:42)                 Narrative:    All CT scans at this facility used dose modulation, iterative reconstruction and/or weight-based dosing when appropriate to reduce radiation doses  as low as reasonably achievable.    HISTORY: Abdominal pain, acute, nonlocalized    FINDINGS: Axial postcontrast imaging was performed with 100 mL Omnipaque 350 IV contrast .    CT ABDOMEN: There are bilateral breast implants. The lung bases are clear.    The liver, spleen, pancreas, gallbladder and adrenal glands are normal.    The kidneys enhance symmetrically without hydronephrosis or calculi.    There are no thick-walled or dilated bowel loops. There is no mesenteric or retroperitoneal adenopathy. Aorta is normal in caliber.    CT PELVIS: There is a 2.3 cm right ovarian cyst. The uterus, left ovary and bladder are normal. There is no adenopathy. There are no acute osseous abnormalities.    IMPRESSION: 2.3 cm right ovarian cyst otherwise negative CT abdomen and pelvis    Electronically signed by:  Carmen Kaiser MD  1/2/2022 5:23 PM CST Workstation: [id number]                               Medications   lactated ringers bolus 1,000 mL (0 mLs Intravenous Stopped 1/2/22 2117)   pantoprazole injection 40 mg (40 mg Intravenous Given 1/2/22 1658)   HYDROmorphone injection 1 mg (1 mg Intravenous Given 1/2/22 1659)   ondansetron injection 8 mg (8 mg Intravenous Given 1/2/22 1658)   iohexoL (OMNIPAQUE 350) injection 100 mL (100 mLs Intravenous Given 1/2/22 1705)   0.9%  NaCl infusion ( Intravenous Stopped 1/2/22 2117)   promethazine (PHENERGAN) 6.25 mg in  dextrose 5 % 50 mL IVPB (0 mg Intravenous Stopped 1/2/22 2117)                          Clinical Impression:   Final diagnoses:  [N83.201] Cyst of right ovary (Primary)          ED Disposition Condition    Discharge Stable        ED Prescriptions     Medication Sig Dispense Start Date End Date Auth. Provider    naproxen (NAPROSYN) 500 MG tablet Take 1 tablet (500 mg total) by mouth 2 (two) times daily with meals. 30 tablet 1/2/2022  Christian Szymanski MD    ondansetron (ZOFRAN) 4 MG tablet Take 1 tablet (4 mg total) by mouth every 6 (six) hours as needed for Nausea. 10 tablet 1/2/2022 1/6/2022 Christian Szymanski MD        Follow-up Information     Follow up With Specialties Details Why Contact Info    Darleen Cordon NP Family Medicine In 1 week  [address] [facility]  [place] [address]  [phone]      Gema Marte MD Obstetrics, Obstetrics and Gynecology, Maternal and Fetal Medicine In 1 week  [address]  [address]  [facility]  [place] [address]  [phone]             Kimi Spencer MD  01/04/22 2526

## 2022-03-22 DIAGNOSIS — Z12.31 ENCOUNTER FOR SCREENING MAMMOGRAM FOR MALIGNANT NEOPLASM OF BREAST: Primary | ICD-10-CM

## 2023-04-29 ENCOUNTER — HOSPITAL ENCOUNTER (EMERGENCY)
Facility: HOSPITAL | Age: 38
Discharge: HOME | End: 2023-04-29
Attending: EMERGENCY MEDICINE
Payer: MEDICAID

## 2023-04-29 VITALS
TEMPERATURE: 99 F | BODY MASS INDEX: 27.31 KG/M2 | HEART RATE: 78 BPM | HEIGHT: 64 IN | RESPIRATION RATE: 16 BRPM | DIASTOLIC BLOOD PRESSURE: 83 MMHG | WEIGHT: 160 LBS | OXYGEN SATURATION: 99 % | SYSTOLIC BLOOD PRESSURE: 128 MMHG

## 2023-04-29 DIAGNOSIS — S61.231A PUNCTURE WOUND OF LEFT INDEX FINGER: Primary | ICD-10-CM

## 2023-04-29 PROCEDURE — 90471 IMMUNIZATION ADMIN: CPT | Performed by: EMERGENCY MEDICINE

## 2023-04-29 PROCEDURE — 25000003 PHARM REV CODE 250: Performed by: EMERGENCY MEDICINE

## 2023-04-29 PROCEDURE — 63600175 PHARM REV CODE 636 W HCPCS: Performed by: EMERGENCY MEDICINE

## 2023-04-29 PROCEDURE — 90715 TDAP VACCINE 7 YRS/> IM: CPT | Performed by: EMERGENCY MEDICINE

## 2023-04-29 PROCEDURE — 99284 EMERGENCY DEPT VISIT MOD MDM: CPT

## 2023-04-29 RX ORDER — CEPHALEXIN 250 MG/1
250 CAPSULE ORAL 4 TIMES DAILY
Qty: 28 CAPSULE | Refills: 0 | Status: SHIPPED | OUTPATIENT
Start: 2023-04-29 | End: 2023-05-06

## 2023-04-29 RX ORDER — CEPHALEXIN 250 MG/1
500 CAPSULE ORAL
Status: COMPLETED | OUTPATIENT
Start: 2023-04-29 | End: 2023-04-29

## 2023-04-29 RX ORDER — MUPIROCIN 20 MG/G
1 OINTMENT TOPICAL
Status: COMPLETED | OUTPATIENT
Start: 2023-04-29 | End: 2023-04-29

## 2023-04-29 RX ORDER — MUPIROCIN 20 MG/G
OINTMENT TOPICAL 3 TIMES DAILY
Qty: 15 G | Refills: 0 | Status: SHIPPED | OUTPATIENT
Start: 2023-04-29 | End: 2023-05-06

## 2023-04-29 RX ADMIN — CLOSTRIDIUM TETANI TOXOID ANTIGEN (FORMALDEHYDE INACTIVATED), CORYNEBACTERIUM DIPHTHERIAE TOXOID ANTIGEN (FORMALDEHYDE INACTIVATED), BORDETELLA PERTUSSIS TOXOID ANTIGEN (GLUTARALDEHYDE INACTIVATED), BORDETELLA PERTUSSIS FILAMENTOUS HEMAGGLUTININ ANTIGEN (FORMALDEHYDE INACTIVATED), BORDETELLA PERTUSSIS PERTACTIN ANTIGEN, AND BORDETELLA PERTUSSIS FIMBRIAE 2/3 ANTIGEN 0.5 ML: 5; 2; 2.5; 5; 3; 5 INJECTION, SUSPENSION INTRAMUSCULAR at 06:04

## 2023-04-29 RX ADMIN — MUPIROCIN 22 G: 20 OINTMENT TOPICAL at 06:04

## 2023-04-29 RX ADMIN — CEPHALEXIN 500 MG: 250 CAPSULE ORAL at 06:04

## 2023-04-29 NOTE — DISCHARGE INSTRUCTIONS
Tylenol Motrin for pain.  Wound care.  Return to emergency department for worsening symptoms or problems

## 2023-04-29 NOTE — ED PROVIDER NOTES
Encounter Date: 4/29/2023       History     Chief Complaint   Patient presents with    Hand Injury     L HAND, NAIL GUN INJURY TO L INDEX FINGER     38-year-old female was using a nail gun and accidentally injured her left hand index finger with a nail gun with partial-thickness penetration and puncture wound.  Nail was removed immediately.  Patient has pain in that area so came here for evaluation.  Denies any other complaints.  Patient has pain with movement of the finger.    Review of patient's allergies indicates:  No Known Allergies  Past Medical History:   Diagnosis Date    Seizures     epilepsy absent seizures     Past Surgical History:   Procedure Laterality Date    EAR RECONSTRUCTION      bilateral ears, r/t birth defect     Family History   Problem Relation Age of Onset    Diabetes Maternal Grandmother     Heart disease Maternal Grandmother     Mental retardation Cousin      Social History     Tobacco Use    Smoking status: Never   Substance Use Topics    Alcohol use: No     Comment: social drinker when not pregnant    Drug use: No     Review of Systems   Constitutional: Negative.    HENT: Negative.     Eyes: Negative.    Respiratory: Negative.     Cardiovascular: Negative.  Negative for chest pain.   Gastrointestinal: Negative.    Endocrine: Negative.    Genitourinary: Negative.    Musculoskeletal: Negative.         Left index finger pain   Skin:  Positive for wound.   Allergic/Immunologic: Negative.    Neurological: Negative.    Hematological: Negative.    Psychiatric/Behavioral: Negative.     All other systems reviewed and are negative.    Physical Exam     Initial Vitals [04/29/23 1704]   BP Pulse Resp Temp SpO2   128/83 78 16 98.6 °F (37 °C) 99 %      MAP       --         Physical Exam    Nursing note and vitals reviewed.  Constitutional: She appears well-developed and well-nourished.   HENT:   Head: Normocephalic and atraumatic.   Nose: Nose normal.   Mouth/Throat: Oropharynx is clear and moist.    Eyes: Conjunctivae and EOM are normal. Pupils are equal, round, and reactive to light.   Neck: Neck supple. No thyromegaly present. No tracheal deviation present. No JVD present.   Normal range of motion.  Cardiovascular:  Normal rate, regular rhythm, normal heart sounds and intact distal pulses.           No murmur heard.  Pulmonary/Chest: Breath sounds normal. No stridor. No respiratory distress. She has no wheezes. She has no rales.   Abdominal: Abdomen is soft. Bowel sounds are normal.   Musculoskeletal:         General: Tenderness present. No edema. Normal range of motion.      Cervical back: Normal range of motion and neck supple.      Comments: Left index finger proximal interphalangeal joint area has mild discomfort with palpation.  Puncture wound just distal to the joint area.  And patient has slight tenderness in that area.  Extremity and finger neurovascularly intact.  Has good range of motion however is painful when she moves the finger.     Neurological: She is alert and oriented to person, place, and time. She has normal strength. GCS score is 15. GCS eye subscore is 4. GCS verbal subscore is 5. GCS motor subscore is 6.   Skin: Skin is warm. Capillary refill takes less than 2 seconds.   Psychiatric: She has a normal mood and affect. Thought content normal.       ED Course   Procedures  Labs Reviewed - No data to display       Imaging Results              X-Ray Hand 3 view Left (Final result)  Result time 04/29/23 17:43:22      Final result by Toney Arreola Jr., MD (04/29/23 17:43:22)                   Narrative:    XR HAND 3 OR MORE VIEWS    LEFT HAND X-RAY-3 VIEW(S) (AP, LATERAL, AND OBLIQUE PROJECTIONS)    HISTORY: Hand injury.    FINDINGS:  The osseous structures appear intact without evidence of an acute fracture deformity.  No significant metabolic, inflammatory, nor neoplastic process is demonstrated.  Soft tissues appear within the range of normal.      IMPRESSION: NEGATIVE  STUDY    Electronically signed by:  Toney Arreola MD  4/29/2023 5:43 PM CDT Workstation: [id number]                                  X-Rays:   Independently Interpreted Readings:   Other Readings:  X-ray of hand does not show any evidence of fracture or foreign body  Medications   Tdap vaccine injection 0.5 mL (has no administration in time range)   mupirocin 2 % ointment 22 g (has no administration in time range)   cephALEXin capsule 500 mg (has no administration in time range)     Medical Decision Making:   Differential Diagnosis:   38-year-old female with a puncture wound to the finger.  Tetanus vaccine updated and prophylactic antibiotics started.  Patient cleaned the wound prior to arrival.  Patient's wound appears to be a very small puncture wound and no obvious bony abnormality noted on x-ray.  Extremities neurovascularly intact.  Wound care and prophylactic antibiotic and discharged with return precautions and instructions and follow-up  Independently Interpreted Test(s):   I have ordered and independently interpreted X-rays - see prior notes.  Clinical Tests:   Radiological Study: Reviewed                        Clinical Impression:   Final diagnoses:  [S61.079A] Puncture wound of left index finger (Primary)        ED Disposition Condition    Discharge Stable          ED Prescriptions       Medication Sig Dispense Start Date End Date Auth. Provider    mupirocin (BACTROBAN) 2 % ointment Apply topically 3 (three) times daily. for 7 days 15 g 4/29/2023 5/6/2023 Kimi Spencer MD    cephALEXin (KEFLEX) 250 MG capsule Take 1 capsule (250 mg total) by mouth 4 (four) times daily. for 7 days 28 capsule 4/29/2023 5/6/2023 Kimi Spencer MD          Follow-up Information       Follow up With Specialties Details Why Contact Info    Darleen Cordon NP Family Medicine In 2 days  [address]  [facility] [id number] [phone]               Kimi Spencer MD  04/29/23 0540

## 2023-10-10 ENCOUNTER — ANESTHESIA EVENT (OUTPATIENT)
Dept: SURGERY | Facility: HOSPITAL | Age: 38
End: 2023-10-10
Payer: MEDICAID

## 2023-10-10 ENCOUNTER — HOSPITAL ENCOUNTER (OUTPATIENT)
Facility: HOSPITAL | Age: 38
Discharge: HOME | End: 2023-10-11
Attending: FAMILY MEDICINE | Admitting: STUDENT IN AN ORGANIZED HEALTH CARE EDUCATION/TRAINING PROGRAM
Payer: MEDICAID

## 2023-10-10 ENCOUNTER — ANESTHESIA (OUTPATIENT)
Dept: SURGERY | Facility: HOSPITAL | Age: 38
End: 2023-10-10
Payer: MEDICAID

## 2023-10-10 DIAGNOSIS — N20.0 NEPHROLITHIASIS: ICD-10-CM

## 2023-10-10 DIAGNOSIS — R10.9 ABDOMINAL PAIN: Primary | ICD-10-CM

## 2023-10-10 DIAGNOSIS — K35.30 ACUTE APPENDICITIS WITH LOCALIZED PERITONITIS, WITHOUT PERFORATION, ABSCESS, OR GANGRENE: ICD-10-CM

## 2023-10-10 PROBLEM — K35.80 ACUTE APPENDICITIS: Status: ACTIVE | Noted: 2023-10-10

## 2023-10-10 LAB
ALBUMIN SERPL BCP-MCNC: 4.7 G/DL (ref 3.5–5.2)
ALP SERPL-CCNC: 52 U/L (ref 55–135)
ALT SERPL W/O P-5'-P-CCNC: 13 U/L (ref 10–44)
ANION GAP SERPL CALC-SCNC: 8 MMOL/L (ref 8–16)
AST SERPL-CCNC: 13 U/L (ref 10–40)
B-HCG UR QL: NEGATIVE
BASOPHILS # BLD AUTO: 0.06 K/UL (ref 0–0.2)
BASOPHILS NFR BLD: 0.4 % (ref 0–1.9)
BILIRUB SERPL-MCNC: 0.7 MG/DL (ref 0.1–1)
BILIRUB UR QL STRIP: NEGATIVE
BUN SERPL-MCNC: 7 MG/DL (ref 6–20)
CALCIUM SERPL-MCNC: 9.5 MG/DL (ref 8.7–10.5)
CHLORIDE SERPL-SCNC: 105 MMOL/L (ref 95–110)
CLARITY UR: CLEAR
CO2 SERPL-SCNC: 24 MMOL/L (ref 23–29)
COLOR UR: YELLOW
CREAT SERPL-MCNC: 0.7 MG/DL (ref 0.5–1.4)
CTP QC/QA: YES
DIFFERENTIAL METHOD: ABNORMAL
EOSINOPHIL # BLD AUTO: 0.1 K/UL (ref 0–0.5)
EOSINOPHIL NFR BLD: 0.3 % (ref 0–8)
ERYTHROCYTE [DISTWIDTH] IN BLOOD BY AUTOMATED COUNT: 12.9 % (ref 11.5–14.5)
EST. GFR  (NO RACE VARIABLE): >60 ML/MIN/1.73 M^2
GLUCOSE SERPL-MCNC: 89 MG/DL (ref 70–110)
GLUCOSE UR QL STRIP: NEGATIVE
HCT VFR BLD AUTO: 43 % (ref 37–48.5)
HGB BLD-MCNC: 14.4 G/DL (ref 12–16)
HGB UR QL STRIP: NEGATIVE
IMM GRANULOCYTES # BLD AUTO: 0.06 K/UL (ref 0–0.04)
IMM GRANULOCYTES NFR BLD AUTO: 0.4 % (ref 0–0.5)
KETONES UR QL STRIP: NEGATIVE
LEUKOCYTE ESTERASE UR QL STRIP: NEGATIVE
LIPASE SERPL-CCNC: 19 U/L (ref 4–60)
LYMPHOCYTES # BLD AUTO: 2.5 K/UL (ref 1–4.8)
LYMPHOCYTES NFR BLD: 15.2 % (ref 18–48)
MCH RBC QN AUTO: 29.8 PG (ref 27–31)
MCHC RBC AUTO-ENTMCNC: 33.5 G/DL (ref 32–36)
MCV RBC AUTO: 89 FL (ref 82–98)
MONOCYTES # BLD AUTO: 0.9 K/UL (ref 0.3–1)
MONOCYTES NFR BLD: 5.1 % (ref 4–15)
NEUTROPHILS # BLD AUTO: 13.2 K/UL (ref 1.8–7.7)
NEUTROPHILS NFR BLD: 78.6 % (ref 38–73)
NITRITE UR QL STRIP: NEGATIVE
NRBC BLD-RTO: 0 /100 WBC
PH UR STRIP: 7 [PH] (ref 5–8)
PLATELET # BLD AUTO: 333 K/UL (ref 150–450)
PMV BLD AUTO: 10.2 FL (ref 9.2–12.9)
POTASSIUM SERPL-SCNC: 3.7 MMOL/L (ref 3.5–5.1)
PROT SERPL-MCNC: 7.5 G/DL (ref 6–8.4)
PROT UR QL STRIP: NEGATIVE
RBC # BLD AUTO: 4.83 M/UL (ref 4–5.4)
SODIUM SERPL-SCNC: 137 MMOL/L (ref 136–145)
SP GR UR STRIP: 1.01 (ref 1–1.03)
URN SPEC COLLECT METH UR: NORMAL
UROBILINOGEN UR STRIP-ACNC: NEGATIVE EU/DL
WBC # BLD AUTO: 16.76 K/UL (ref 3.9–12.7)

## 2023-10-10 PROCEDURE — 80053 COMPREHEN METABOLIC PANEL: CPT

## 2023-10-10 PROCEDURE — G0378 HOSPITAL OBSERVATION PER HR: HCPCS

## 2023-10-10 PROCEDURE — 81003 URINALYSIS AUTO W/O SCOPE: CPT

## 2023-10-10 PROCEDURE — 44970 LAPAROSCOPY APPENDECTOMY: CPT | Mod: ,,, | Performed by: SURGERY

## 2023-10-10 PROCEDURE — 99223 1ST HOSP IP/OBS HIGH 75: CPT | Mod: 57,,, | Performed by: SURGERY

## 2023-10-10 PROCEDURE — 99223 PR INITIAL HOSPITAL CARE,LEVL III: ICD-10-PCS | Mod: 57,,, | Performed by: SURGERY

## 2023-10-10 PROCEDURE — 44970 PR LAP,APPENDECTOMY: ICD-10-PCS | Mod: ,,, | Performed by: SURGERY

## 2023-10-10 PROCEDURE — 81025 URINE PREGNANCY TEST: CPT

## 2023-10-10 PROCEDURE — 83690 ASSAY OF LIPASE: CPT

## 2023-10-10 PROCEDURE — 25500020 PHARM REV CODE 255: Performed by: STUDENT IN AN ORGANIZED HEALTH CARE EDUCATION/TRAINING PROGRAM

## 2023-10-10 PROCEDURE — 99285 EMERGENCY DEPT VISIT HI MDM: CPT | Mod: 25

## 2023-10-10 PROCEDURE — 85025 COMPLETE CBC W/AUTO DIFF WBC: CPT

## 2023-10-10 RX ORDER — SERTRALINE HYDROCHLORIDE 50 MG/1
50 TABLET, FILM COATED ORAL DAILY
COMMUNITY
Start: 2023-09-17

## 2023-10-10 RX ORDER — MINOXIDIL 2.5 MG/1
2.5 TABLET ORAL DAILY
COMMUNITY
Start: 2023-09-17

## 2023-10-10 RX ORDER — TRAZODONE HYDROCHLORIDE 50 MG/1
50 TABLET ORAL NIGHTLY
COMMUNITY
Start: 2023-09-17

## 2023-10-10 RX ORDER — ATORVASTATIN CALCIUM 20 MG/1
20 TABLET, FILM COATED ORAL NIGHTLY
COMMUNITY
Start: 2023-09-17

## 2023-10-10 RX ORDER — LISDEXAMFETAMINE DIMESYLATE 50 MG/1
50 CAPSULE ORAL DAILY
COMMUNITY
Start: 2023-10-02

## 2023-10-10 RX ADMIN — IOHEXOL 100 ML: 350 INJECTION, SOLUTION INTRAVENOUS at 08:10

## 2023-10-11 ENCOUNTER — PATIENT MESSAGE (OUTPATIENT)
Dept: PAIN MEDICINE | Facility: CLINIC | Age: 38
End: 2023-10-11
Payer: MEDICAID

## 2023-10-11 VITALS
HEIGHT: 64 IN | TEMPERATURE: 98 F | HEART RATE: 93 BPM | WEIGHT: 149.94 LBS | OXYGEN SATURATION: 98 % | SYSTOLIC BLOOD PRESSURE: 105 MMHG | DIASTOLIC BLOOD PRESSURE: 69 MMHG | RESPIRATION RATE: 18 BRPM | BODY MASS INDEX: 25.6 KG/M2

## 2023-10-11 PROBLEM — K35.80 ACUTE APPENDICITIS: Status: RESOLVED | Noted: 2023-10-10 | Resolved: 2023-10-11

## 2023-10-11 LAB
ALBUMIN SERPL BCP-MCNC: 4.3 G/DL (ref 3.5–5.2)
ALP SERPL-CCNC: 48 U/L (ref 55–135)
ALT SERPL W/O P-5'-P-CCNC: 11 U/L (ref 10–44)
ANION GAP SERPL CALC-SCNC: 8 MMOL/L (ref 8–16)
ANION GAP SERPL CALC-SCNC: 8 MMOL/L (ref 8–16)
AST SERPL-CCNC: 11 U/L (ref 10–40)
BASOPHILS # BLD AUTO: 0.04 K/UL (ref 0–0.2)
BASOPHILS NFR BLD: 0.2 % (ref 0–1.9)
BILIRUB SERPL-MCNC: 0.5 MG/DL (ref 0.1–1)
BUN SERPL-MCNC: 9 MG/DL (ref 6–20)
BUN SERPL-MCNC: 9 MG/DL (ref 6–20)
CALCIUM SERPL-MCNC: 8.9 MG/DL (ref 8.7–10.5)
CALCIUM SERPL-MCNC: 8.9 MG/DL (ref 8.7–10.5)
CHLORIDE SERPL-SCNC: 104 MMOL/L (ref 95–110)
CHLORIDE SERPL-SCNC: 104 MMOL/L (ref 95–110)
CO2 SERPL-SCNC: 23 MMOL/L (ref 23–29)
CO2 SERPL-SCNC: 23 MMOL/L (ref 23–29)
CREAT SERPL-MCNC: 0.7 MG/DL (ref 0.5–1.4)
CREAT SERPL-MCNC: 0.7 MG/DL (ref 0.5–1.4)
DIFFERENTIAL METHOD: ABNORMAL
EOSINOPHIL # BLD AUTO: 0 K/UL (ref 0–0.5)
EOSINOPHIL NFR BLD: 0.1 % (ref 0–8)
ERYTHROCYTE [DISTWIDTH] IN BLOOD BY AUTOMATED COUNT: 12.9 % (ref 11.5–14.5)
ERYTHROCYTE [DISTWIDTH] IN BLOOD BY AUTOMATED COUNT: 12.9 % (ref 11.5–14.5)
EST. GFR  (NO RACE VARIABLE): >60 ML/MIN/1.73 M^2
EST. GFR  (NO RACE VARIABLE): >60 ML/MIN/1.73 M^2
GLUCOSE SERPL-MCNC: 126 MG/DL (ref 70–110)
GLUCOSE SERPL-MCNC: 158 MG/DL (ref 70–110)
GLUCOSE SERPL-MCNC: 158 MG/DL (ref 70–110)
HCT VFR BLD AUTO: 40.2 % (ref 37–48.5)
HCT VFR BLD AUTO: 40.2 % (ref 37–48.5)
HGB BLD-MCNC: 13.3 G/DL (ref 12–16)
HGB BLD-MCNC: 13.3 G/DL (ref 12–16)
IMM GRANULOCYTES # BLD AUTO: 0.09 K/UL (ref 0–0.04)
IMM GRANULOCYTES NFR BLD AUTO: 0.5 % (ref 0–0.5)
LYMPHOCYTES # BLD AUTO: 0.6 K/UL (ref 1–4.8)
LYMPHOCYTES NFR BLD: 3.6 % (ref 18–48)
MCH RBC QN AUTO: 29.9 PG (ref 27–31)
MCH RBC QN AUTO: 29.9 PG (ref 27–31)
MCHC RBC AUTO-ENTMCNC: 33.1 G/DL (ref 32–36)
MCHC RBC AUTO-ENTMCNC: 33.1 G/DL (ref 32–36)
MCV RBC AUTO: 90 FL (ref 82–98)
MCV RBC AUTO: 90 FL (ref 82–98)
MONOCYTES # BLD AUTO: 0.2 K/UL (ref 0.3–1)
MONOCYTES NFR BLD: 0.9 % (ref 4–15)
NEUTROPHILS # BLD AUTO: 15.9 K/UL (ref 1.8–7.7)
NEUTROPHILS NFR BLD: 94.7 % (ref 38–73)
NRBC BLD-RTO: 0 /100 WBC
PLATELET # BLD AUTO: 294 K/UL (ref 150–450)
PLATELET # BLD AUTO: 294 K/UL (ref 150–450)
PMV BLD AUTO: 10.6 FL (ref 9.2–12.9)
PMV BLD AUTO: 10.6 FL (ref 9.2–12.9)
POTASSIUM SERPL-SCNC: 3.7 MMOL/L (ref 3.5–5.1)
POTASSIUM SERPL-SCNC: 3.7 MMOL/L (ref 3.5–5.1)
PROT SERPL-MCNC: 7.1 G/DL (ref 6–8.4)
RBC # BLD AUTO: 4.45 M/UL (ref 4–5.4)
RBC # BLD AUTO: 4.45 M/UL (ref 4–5.4)
SODIUM SERPL-SCNC: 135 MMOL/L (ref 136–145)
SODIUM SERPL-SCNC: 135 MMOL/L (ref 136–145)
WBC # BLD AUTO: 16.78 K/UL (ref 3.9–12.7)
WBC # BLD AUTO: 16.78 K/UL (ref 3.9–12.7)

## 2023-10-11 PROCEDURE — 25000003 PHARM REV CODE 250: Performed by: SURGERY

## 2023-10-11 PROCEDURE — 96365 THER/PROPH/DIAG IV INF INIT: CPT | Mod: 59

## 2023-10-11 PROCEDURE — 80053 COMPREHEN METABOLIC PANEL: CPT | Performed by: STUDENT IN AN ORGANIZED HEALTH CARE EDUCATION/TRAINING PROGRAM

## 2023-10-11 PROCEDURE — 27201423 OPTIME MED/SURG SUP & DEVICES STERILE SUPPLY: Performed by: SURGERY

## 2023-10-11 PROCEDURE — 25000003 PHARM REV CODE 250: Performed by: NURSE ANESTHETIST, CERTIFIED REGISTERED

## 2023-10-11 PROCEDURE — 37000008 HC ANESTHESIA 1ST 15 MINUTES: Performed by: SURGERY

## 2023-10-11 PROCEDURE — D9220A PRA ANESTHESIA: Mod: ANES,,, | Performed by: ANESTHESIOLOGY

## 2023-10-11 PROCEDURE — 96376 TX/PRO/DX INJ SAME DRUG ADON: CPT | Mod: 59

## 2023-10-11 PROCEDURE — 63600175 PHARM REV CODE 636 W HCPCS: Performed by: NURSE ANESTHETIST, CERTIFIED REGISTERED

## 2023-10-11 PROCEDURE — 71000039 HC RECOVERY, EACH ADD'L HOUR: Performed by: SURGERY

## 2023-10-11 PROCEDURE — 37000009 HC ANESTHESIA EA ADD 15 MINS: Performed by: SURGERY

## 2023-10-11 PROCEDURE — 36000709 HC OR TIME LEV III EA ADD 15 MIN: Performed by: SURGERY

## 2023-10-11 PROCEDURE — D9220A PRA ANESTHESIA: Mod: CRNA,,, | Performed by: NURSE ANESTHETIST, CERTIFIED REGISTERED

## 2023-10-11 PROCEDURE — 25000003 PHARM REV CODE 250: Performed by: STUDENT IN AN ORGANIZED HEALTH CARE EDUCATION/TRAINING PROGRAM

## 2023-10-11 PROCEDURE — 36000708 HC OR TIME LEV III 1ST 15 MIN: Performed by: SURGERY

## 2023-10-11 PROCEDURE — D9220A PRA ANESTHESIA: ICD-10-PCS | Mod: ANES,,, | Performed by: ANESTHESIOLOGY

## 2023-10-11 PROCEDURE — 71000033 HC RECOVERY, INTIAL HOUR: Performed by: SURGERY

## 2023-10-11 PROCEDURE — D9220A PRA ANESTHESIA: ICD-10-PCS | Mod: CRNA,,, | Performed by: NURSE ANESTHETIST, CERTIFIED REGISTERED

## 2023-10-11 PROCEDURE — 85025 COMPLETE CBC W/AUTO DIFF WBC: CPT | Performed by: STUDENT IN AN ORGANIZED HEALTH CARE EDUCATION/TRAINING PROGRAM

## 2023-10-11 PROCEDURE — 36415 COLL VENOUS BLD VENIPUNCTURE: CPT | Performed by: STUDENT IN AN ORGANIZED HEALTH CARE EDUCATION/TRAINING PROGRAM

## 2023-10-11 PROCEDURE — G0378 HOSPITAL OBSERVATION PER HR: HCPCS

## 2023-10-11 PROCEDURE — 63600175 PHARM REV CODE 636 W HCPCS: Performed by: SURGERY

## 2023-10-11 PROCEDURE — 96367 TX/PROPH/DG ADDL SEQ IV INF: CPT | Mod: 59

## 2023-10-11 PROCEDURE — 63600175 PHARM REV CODE 636 W HCPCS: Performed by: STUDENT IN AN ORGANIZED HEALTH CARE EDUCATION/TRAINING PROGRAM

## 2023-10-11 RX ORDER — SCOLOPAMINE TRANSDERMAL SYSTEM 1 MG/1
1 PATCH, EXTENDED RELEASE TRANSDERMAL ONCE
Status: DISCONTINUED | OUTPATIENT
Start: 2023-10-11 | End: 2023-10-11 | Stop reason: HOSPADM

## 2023-10-11 RX ORDER — PHENYLEPHRINE HCL IN 0.9% NACL 1 MG/10 ML
SYRINGE (ML) INTRAVENOUS
Status: DISCONTINUED | OUTPATIENT
Start: 2023-10-11 | End: 2023-10-11

## 2023-10-11 RX ORDER — ACETAMINOPHEN 10 MG/ML
INJECTION, SOLUTION INTRAVENOUS
Status: DISCONTINUED | OUTPATIENT
Start: 2023-10-11 | End: 2023-10-11

## 2023-10-11 RX ORDER — PROPOFOL 10 MG/ML
VIAL (ML) INTRAVENOUS
Status: DISCONTINUED | OUTPATIENT
Start: 2023-10-11 | End: 2023-10-11

## 2023-10-11 RX ORDER — OXYCODONE HYDROCHLORIDE 5 MG/1
5 TABLET ORAL
Status: DISCONTINUED | OUTPATIENT
Start: 2023-10-11 | End: 2023-10-11 | Stop reason: HOSPADM

## 2023-10-11 RX ORDER — TALC
6 POWDER (GRAM) TOPICAL NIGHTLY PRN
Status: DISCONTINUED | OUTPATIENT
Start: 2023-10-11 | End: 2023-10-11 | Stop reason: HOSPADM

## 2023-10-11 RX ORDER — MUPIROCIN 20 MG/G
1 OINTMENT TOPICAL 2 TIMES DAILY
Status: DISCONTINUED | OUTPATIENT
Start: 2023-10-11 | End: 2023-10-11 | Stop reason: HOSPADM

## 2023-10-11 RX ORDER — HYDROMORPHONE HYDROCHLORIDE 1 MG/ML
1 INJECTION, SOLUTION INTRAMUSCULAR; INTRAVENOUS; SUBCUTANEOUS EVERY 4 HOURS PRN
Status: DISCONTINUED | OUTPATIENT
Start: 2023-10-11 | End: 2023-10-11 | Stop reason: HOSPADM

## 2023-10-11 RX ORDER — POLYETHYLENE GLYCOL 3350 17 G/17G
17 POWDER, FOR SOLUTION ORAL DAILY
Status: DISCONTINUED | OUTPATIENT
Start: 2023-10-11 | End: 2023-10-11 | Stop reason: HOSPADM

## 2023-10-11 RX ORDER — METRONIDAZOLE 500 MG/100ML
500 INJECTION, SOLUTION INTRAVENOUS
Status: COMPLETED | OUTPATIENT
Start: 2023-10-11 | End: 2023-10-11

## 2023-10-11 RX ORDER — SODIUM CHLORIDE 9 MG/ML
INJECTION, SOLUTION INTRAVENOUS CONTINUOUS
Status: DISCONTINUED | OUTPATIENT
Start: 2023-10-11 | End: 2023-10-11

## 2023-10-11 RX ORDER — ONDANSETRON 2 MG/ML
4 INJECTION INTRAMUSCULAR; INTRAVENOUS DAILY PRN
Status: DISCONTINUED | OUTPATIENT
Start: 2023-10-11 | End: 2023-10-11 | Stop reason: HOSPADM

## 2023-10-11 RX ORDER — ONDANSETRON 4 MG/1
4 TABLET, FILM COATED ORAL EVERY 6 HOURS PRN
Qty: 30 TABLET | Refills: 1 | Status: SHIPPED | OUTPATIENT
Start: 2023-10-11

## 2023-10-11 RX ORDER — SODIUM CHLORIDE 0.9 % (FLUSH) 0.9 %
10 SYRINGE (ML) INJECTION EVERY 6 HOURS PRN
Status: DISCONTINUED | OUTPATIENT
Start: 2023-10-11 | End: 2023-10-11 | Stop reason: HOSPADM

## 2023-10-11 RX ORDER — FAMOTIDINE 10 MG/ML
INJECTION INTRAVENOUS
Status: DISCONTINUED | OUTPATIENT
Start: 2023-10-11 | End: 2023-10-11

## 2023-10-11 RX ORDER — DIPHENHYDRAMINE HYDROCHLORIDE 50 MG/ML
12.5 INJECTION INTRAMUSCULAR; INTRAVENOUS
Status: DISCONTINUED | OUTPATIENT
Start: 2023-10-11 | End: 2023-10-11 | Stop reason: HOSPADM

## 2023-10-11 RX ORDER — SODIUM CHLORIDE, SODIUM LACTATE, POTASSIUM CHLORIDE, CALCIUM CHLORIDE 600; 310; 30; 20 MG/100ML; MG/100ML; MG/100ML; MG/100ML
INJECTION, SOLUTION INTRAVENOUS CONTINUOUS
Status: DISCONTINUED | OUTPATIENT
Start: 2023-10-11 | End: 2023-10-11

## 2023-10-11 RX ORDER — MORPHINE SULFATE 4 MG/ML
4 INJECTION, SOLUTION INTRAMUSCULAR; INTRAVENOUS EVERY 4 HOURS PRN
Status: DISCONTINUED | OUTPATIENT
Start: 2023-10-11 | End: 2023-10-11

## 2023-10-11 RX ORDER — HYDROMORPHONE HYDROCHLORIDE 1 MG/ML
0.2 INJECTION, SOLUTION INTRAMUSCULAR; INTRAVENOUS; SUBCUTANEOUS EVERY 5 MIN PRN
Status: DISCONTINUED | OUTPATIENT
Start: 2023-10-11 | End: 2023-10-11 | Stop reason: HOSPADM

## 2023-10-11 RX ORDER — MIDAZOLAM HYDROCHLORIDE 1 MG/ML
INJECTION INTRAMUSCULAR; INTRAVENOUS
Status: DISCONTINUED | OUTPATIENT
Start: 2023-10-11 | End: 2023-10-11

## 2023-10-11 RX ORDER — ONDANSETRON 2 MG/ML
4 INJECTION INTRAMUSCULAR; INTRAVENOUS EVERY 8 HOURS PRN
Status: DISCONTINUED | OUTPATIENT
Start: 2023-10-11 | End: 2023-10-11 | Stop reason: HOSPADM

## 2023-10-11 RX ORDER — ONDANSETRON 2 MG/ML
4 INJECTION INTRAMUSCULAR; INTRAVENOUS EVERY 12 HOURS PRN
Status: DISCONTINUED | OUTPATIENT
Start: 2023-10-11 | End: 2023-10-11

## 2023-10-11 RX ORDER — ACETAMINOPHEN 325 MG/1
650 TABLET ORAL EVERY 8 HOURS PRN
Status: DISCONTINUED | OUTPATIENT
Start: 2023-10-11 | End: 2023-10-11 | Stop reason: HOSPADM

## 2023-10-11 RX ORDER — ONDANSETRON 2 MG/ML
INJECTION INTRAMUSCULAR; INTRAVENOUS
Status: DISCONTINUED | OUTPATIENT
Start: 2023-10-11 | End: 2023-10-11

## 2023-10-11 RX ORDER — PROCHLORPERAZINE EDISYLATE 5 MG/ML
5 INJECTION INTRAMUSCULAR; INTRAVENOUS EVERY 6 HOURS PRN
Status: DISCONTINUED | OUTPATIENT
Start: 2023-10-11 | End: 2023-10-11 | Stop reason: HOSPADM

## 2023-10-11 RX ORDER — DEXAMETHASONE SODIUM PHOSPHATE 4 MG/ML
INJECTION, SOLUTION INTRA-ARTICULAR; INTRALESIONAL; INTRAMUSCULAR; INTRAVENOUS; SOFT TISSUE
Status: DISCONTINUED | OUTPATIENT
Start: 2023-10-11 | End: 2023-10-11

## 2023-10-11 RX ORDER — ROCURONIUM BROMIDE 10 MG/ML
INJECTION, SOLUTION INTRAVENOUS
Status: DISCONTINUED | OUTPATIENT
Start: 2023-10-11 | End: 2023-10-11

## 2023-10-11 RX ORDER — DIPHENHYDRAMINE HYDROCHLORIDE 50 MG/ML
INJECTION INTRAMUSCULAR; INTRAVENOUS
Status: DISCONTINUED | OUTPATIENT
Start: 2023-10-11 | End: 2023-10-11

## 2023-10-11 RX ORDER — IBUPROFEN 800 MG/1
800 TABLET ORAL 3 TIMES DAILY PRN
Qty: 30 TABLET | Refills: 1 | Status: SHIPPED | OUTPATIENT
Start: 2023-10-11

## 2023-10-11 RX ORDER — CEFAZOLIN SODIUM 2 G/50ML
2 SOLUTION INTRAVENOUS
Status: DISCONTINUED | OUTPATIENT
Start: 2023-10-11 | End: 2023-10-11 | Stop reason: HOSPADM

## 2023-10-11 RX ORDER — FENTANYL CITRATE 50 UG/ML
INJECTION, SOLUTION INTRAMUSCULAR; INTRAVENOUS
Status: DISCONTINUED | OUTPATIENT
Start: 2023-10-11 | End: 2023-10-11

## 2023-10-11 RX ORDER — BUPIVACAINE HYDROCHLORIDE AND EPINEPHRINE 5; 5 MG/ML; UG/ML
INJECTION, SOLUTION EPIDURAL; INTRACAUDAL; PERINEURAL
Status: DISCONTINUED | OUTPATIENT
Start: 2023-10-11 | End: 2023-10-11 | Stop reason: HOSPADM

## 2023-10-11 RX ORDER — CEFAZOLIN SODIUM 1 G/3ML
INJECTION, POWDER, FOR SOLUTION INTRAMUSCULAR; INTRAVENOUS
Status: DISCONTINUED | OUTPATIENT
Start: 2023-10-11 | End: 2023-10-11

## 2023-10-11 RX ORDER — AMOXICILLIN AND CLAVULANATE POTASSIUM 500; 125 MG/1; MG/1
1 TABLET, FILM COATED ORAL 2 TIMES DAILY
Qty: 10 TABLET | Refills: 0 | Status: SHIPPED | OUTPATIENT
Start: 2023-10-11 | End: 2023-10-16

## 2023-10-11 RX ORDER — HYDROCODONE BITARTRATE AND ACETAMINOPHEN 5; 325 MG/1; MG/1
1 TABLET ORAL EVERY 4 HOURS PRN
Status: DISCONTINUED | OUTPATIENT
Start: 2023-10-11 | End: 2023-10-11

## 2023-10-11 RX ORDER — HYDROCODONE BITARTRATE AND ACETAMINOPHEN 5; 325 MG/1; MG/1
1 TABLET ORAL EVERY 4 HOURS PRN
Status: DISCONTINUED | OUTPATIENT
Start: 2023-10-11 | End: 2023-10-11 | Stop reason: HOSPADM

## 2023-10-11 RX ADMIN — FENTANYL CITRATE 100 MCG: 50 INJECTION, SOLUTION INTRAMUSCULAR; INTRAVENOUS at 12:10

## 2023-10-11 RX ADMIN — FAMOTIDINE 20 MG: 10 INJECTION, SOLUTION INTRAVENOUS at 12:10

## 2023-10-11 RX ADMIN — METRONIDAZOLE 500 MG: 5 INJECTION, SOLUTION INTRAVENOUS at 03:10

## 2023-10-11 RX ADMIN — POLYETHYLENE GLYCOL 3350 17 G: 17 POWDER, FOR SOLUTION ORAL at 08:10

## 2023-10-11 RX ADMIN — ROCURONIUM BROMIDE 50 MG: 10 INJECTION, SOLUTION INTRAVENOUS at 12:10

## 2023-10-11 RX ADMIN — PROPOFOL 150 MG: 10 INJECTION, EMULSION INTRAVENOUS at 12:10

## 2023-10-11 RX ADMIN — SUGAMMADEX 200 MG: 100 INJECTION, SOLUTION INTRAVENOUS at 01:10

## 2023-10-11 RX ADMIN — DIPHENHYDRAMINE HYDROCHLORIDE 6.25 MG: 50 INJECTION INTRAMUSCULAR; INTRAVENOUS at 12:10

## 2023-10-11 RX ADMIN — CEFAZOLIN SODIUM 2 G: 2 SOLUTION INTRAVENOUS at 11:10

## 2023-10-11 RX ADMIN — DEXAMETHASONE SODIUM PHOSPHATE 4 MG: 4 INJECTION, SOLUTION INTRAMUSCULAR; INTRAVENOUS at 12:10

## 2023-10-11 RX ADMIN — MIDAZOLAM HYDROCHLORIDE 2 MG: 1 INJECTION, SOLUTION INTRAMUSCULAR; INTRAVENOUS at 12:10

## 2023-10-11 RX ADMIN — SODIUM CHLORIDE, SODIUM LACTATE, POTASSIUM CHLORIDE, AND CALCIUM CHLORIDE: .6; .31; .03; .02 INJECTION, SOLUTION INTRAVENOUS at 03:10

## 2023-10-11 RX ADMIN — SODIUM CHLORIDE: 0.9 INJECTION, SOLUTION INTRAVENOUS at 06:10

## 2023-10-11 RX ADMIN — METRONIDAZOLE 500 MG: 5 INJECTION, SOLUTION INTRAVENOUS at 11:10

## 2023-10-11 RX ADMIN — MUPIROCIN 1 G: 20 OINTMENT TOPICAL at 08:10

## 2023-10-11 RX ADMIN — CEFAZOLIN 2 G: 330 INJECTION, POWDER, FOR SOLUTION INTRAMUSCULAR; INTRAVENOUS at 12:10

## 2023-10-11 RX ADMIN — ONDANSETRON 4 MG: 2 INJECTION INTRAMUSCULAR; INTRAVENOUS at 12:10

## 2023-10-11 RX ADMIN — ACETAMINOPHEN 1000 MG: 10 INJECTION, SOLUTION INTRAVENOUS at 12:10

## 2023-10-11 RX ADMIN — Medication 200 MCG: at 12:10

## 2023-10-11 RX ADMIN — SODIUM CHLORIDE, SODIUM LACTATE, POTASSIUM CHLORIDE, AND CALCIUM CHLORIDE: .6; .31; .03; .02 INJECTION, SOLUTION INTRAVENOUS at 12:10

## 2023-10-11 NOTE — OP NOTE
Date of procedure:  October 11, 2023     Staff surgeon:  Dr. Dionicio Mast     Preoperative diagnosis:  Acute appendicitis    Postoperative diagnosis:  The same    Procedure:  Laparoscopic appendectomy    Anesthesia: General endotracheal anesthesia    Indication for procedure:   38-year-old female presented to the ER with symptoms and signs consistent with acute appendicitis.  She was scheduled for laparoscopic appendectomy the above-mentioned date.    Description of procedure:  Following signing informed consent she was taken the operating room placed supine position.  General endotracheal anesthesia was administered.  Wu catheter was inserted.  Abdomen is prepped and draped standard fashion.  A time-out procedure was performed.  Small vertical incision was made just below the umbilicus.  Veress needle was used to enter the abdominal cavity.  Pneumoperitoneum to 15 mmHg was established.  12 mm laparoscopic trocar was placed under direct visualization.  Evaluate the abdominal cavity after the patient trocar was placed.  No evidence of injury on 2 from the Veress needle.  Patient was placed in Trendelenburg with tilt to the left.  The cecum was very high in the upper abdomen.  Placed a 5 mm trocar several cm above the umbilicus.  A 3rd 5 mm trocar was placed below the umbilicus.  A mobilized the cecum and identify the appendix coming off the cecum in a posterior lateral position.  It is dilated and distended.  I grasped the appendix  it from its retroperitoneal attachments.  Next I dissect at the base of the appendix  the mesoappendix from the appendix.  I staple across the appendix at its base without event.  Next staple across the mesoappendix uneventfully.  The appendix is removed from the abdominal cavity with the assistance of an Endo-Catch bag.  All staple lines were visualized and noted to be hemostatic.  There was a mild amount of purulent fluid that was sucked out and removed from the  pelvis.  Next all trocars were removed under direct visualization.  The umbilical fascia was closed with 0 Vicryl suture.  Skin incisions closed with 4-0 Monocryl.  Patient was extubated taken to recovery room stable condition.  There were no immediate complications.  Blood loss was 15 cc.

## 2023-10-11 NOTE — CONSULTS
[facility] - Emergency Dept  General Surgery  Consult Note    Patient Name: Lakshmi Rodriguez  MRN: [id number]  Code Status: Prior  Admission Date: 10/10/2023  Hospital Length of Stay: 0 days  Attending Physician: Lisandro Bob MD  Primary Care Provider: Darleen Cordon NP    Patient information was obtained from patient and ER records.     Inpatient consult to General Surgery  Consult performed by: Dionicio Mast MD  Consult ordered by: Rudy Lozano MD        Subjective:     Principal Problem: <principal problem not specified>    History of Present Illness: Pleasant 38 year female who presented to the ER this evening with worsening abdominal pain that localized to the right lower quadrant.  Patient notes she began with pain yesterday centralized in the belly.  Throughout the day today it began to localize into the right lower quadrant.  She came to the ER this evening secondary to the pain.  CT scan in the ER demonstrated findings consistent with acute appendicitis.  Surgery was consulted.  Patient has no significant medical history.  Her past surgical history significant for  x2.      No current facility-administered medications on file prior to encounter.     Current Outpatient Medications on File Prior to Encounter   Medication Sig    atorvastatin (LIPITOR) 20 MG tablet Take 20 mg by mouth every evening.    minoxidiL (LONITEN) 2.5 MG tablet Take 2.5 mg by mouth once daily.    sertraline (ZOLOFT) 50 MG tablet Take 50 mg by mouth once daily.    traZODone (DESYREL) 50 MG tablet Take 50 mg by mouth every evening.    VYVANSE 50 mg capsule Take 50 mg by mouth once daily.    diclofenac (VOLTAREN) 50 MG EC tablet Take 1 tablet (50 mg total) by mouth 3 (three) times daily as needed.    SF 5000 PLUS 1.1 % Crea BRUSH ON NIGHTLY AFTER NORMAL HYGIENE REGIMEN SPIT OUT EXCESS. DO NOT RINSE    [DISCONTINUED] naproxen (NAPROSYN) 500 MG tablet Take 1 tablet (500 mg total) by mouth 2  (two) times daily with meals.    [DISCONTINUED] sulfamethoxazole-trimethoprim 800-160mg (BACTRIM DS) 800-160 mg Tab Take 1 tablet by mouth 2 (two) times daily.       Review of patient's allergies indicates:   Allergen Reactions    Anesthetics - devon type- parabens Nausea Only       Past Medical History:   Diagnosis Date    Seizures     epilepsy absent seizures     Past Surgical History:   Procedure Laterality Date    EAR RECONSTRUCTION      bilateral ears, r/t birth defect     Family History       Problem Relation (Age of Onset)    Diabetes Maternal Grandmother    Heart disease Maternal Grandmother    Mental retardation Cousin          Tobacco Use    Smoking status: Never    Smokeless tobacco: Not on file   Substance and Sexual Activity    Alcohol use: No     Comment: social drinker when not pregnant    Drug use: No    Sexual activity: Not on file     Review of Systems   Constitutional:  Negative for activity change and appetite change.   Cardiovascular:  Negative for chest pain.   Gastrointestinal:  Positive for abdominal pain. Negative for abdominal distention, nausea and vomiting.   Skin:  Negative for color change.     Objective:     Vital Signs (Most Recent):  Temp: 98.5 °F (36.9 °C) (10/10/23 1708)  Pulse: 94 (10/10/23 2124)  Resp: 18 (10/10/23 2124)  BP: 124/69 (10/10/23 2124)  SpO2: 98 % (10/10/23 2124) Vital Signs (24h Range):  Temp:  [98.5 °F (36.9 °C)] 98.5 °F (36.9 °C)  Pulse:  [] 94  Resp:  [18-24] 18  SpO2:  [97 %-98 %] 98 %  BP: (124-127)/(69-84) 124/69     Weight: 68.9 kg (152 lb)  Body mass index is 26.09 kg/m².     Physical Exam  Vitals reviewed.   Cardiovascular:      Rate and Rhythm: Normal rate.      Pulses: Normal pulses.   Pulmonary:      Effort: Pulmonary effort is normal. No respiratory distress.      Breath sounds: No wheezing.   Abdominal:      General: Abdomen is flat. There is no distension.      Tenderness: There is abdominal tenderness. There is no guarding or rebound.    Neurological:      Mental Status: She is alert.   Psychiatric:         Mood and Affect: Mood normal.            I have reviewed all pertinent lab results within the past 24 hours.  CBC:   Recent Labs   Lab 10/10/23  1728   WBC 16.76*   RBC 4.83   HGB 14.4   HCT 43.0      MCV 89   MCH 29.8   MCHC 33.5     BMP:   Recent Labs   Lab 10/10/23  1728   GLU 89      K 3.7      CO2 24   BUN 7   CREATININE 0.7   CALCIUM 9.5       Significant Diagnostics:  Ct scan reviewed and with findings consistent with acute appendicitis.        Assessment/Plan:     Acute appendicitis  To OR today for laparoscopic appendectomy      VTE Risk Mitigation (From admission, onward)    None          Thank you for your consult. I will follow-up with patient. Please contact us if you have any additional questions.    Dionicio Mast MD  General Surgery  [facility] - Emergency Dept

## 2023-10-11 NOTE — HOSPITAL COURSE
38 year old female admitted with acute appendicitis.  She underwent lap appendectomy with General surgery and has tolerated well.  Pain is controlled, she is eating and ambulating without difficulty. She wishes for ibuprofen 800mg rx for pain control which I have provided.  Rx for zofran also provided in the event she needs it, augmentin course.  Activity restrictions given. Returned precautions given. Examined on day of discharge and alert, NAD, comfortable respirationson room air, abd is soft/appropriately TTP/well approximated laparoscopic incision sites.  She will follow up with General Surgery in clinic.

## 2023-10-11 NOTE — PLAN OF CARE
Problem: Adult Inpatient Plan of Care  Goal: Plan of Care Review  Outcome: Ongoing, Progressing  Goal: Patient-Specific Goal (Individualized)  Outcome: Ongoing, Progressing  Goal: Absence of Hospital-Acquired Illness or Injury  Outcome: Ongoing, Progressing  Goal: Optimal Comfort and Wellbeing  Outcome: Ongoing, Progressing  Goal: Readiness for Transition of Care  Outcome: Ongoing, Progressing     Problem: ARDS (Acute Respiratory Distress Syndrome)  Goal: Effective Oxygenation  Outcome: Ongoing, Progressing     Problem: Fall Injury Risk  Goal: Absence of Fall and Fall-Related Injury  Outcome: Ongoing, Progressing     Problem: Impaired Wound Healing  Goal: Optimal Wound Healing  Outcome: Ongoing, Progressing

## 2023-10-11 NOTE — H&P
[facility] - Emergency Dept  Hospital Medicine  History & Physical    Patient Name: Lakshmi Rodriguez  MRN: [id number]  Patient Class: OP- Observation  Admission Date: 10/10/2023  Attending Physician: Lisandro Bob MD   Primary Care Provider: Darleen Cordon NP         Patient information was obtained from patient and ER records.     Subjective:     Principal Problem:Acute appendicitis    Chief Complaint:   Chief Complaint   Patient presents with    Flank Pain    Abdominal Pain     Right sided abd pain and flank pain x several days that had worsened        HPI: Patient is a 38-year-old female with no significant past medical history who presented with 1 week of ongoing right lower quadrant abdominal pain.  She thought that she was having excessive gas and abdominal cramping and bloating.  She was folding clothes today and developed severe pain in her right lower quadrant.  She says the pain was excruciating which was what brought her to the ER.  She denies any nausea or vomiting.  No constipation or diarrhea.  She denies fever or chills.    In the ED:  Vital signs are stable, WBC 16.7, hemoglobin 14.4, platelets are 333, chemistry panel unremarkable, urinalysis unremarkable.  CT scan showing acute appendicitis.  Discussed with General surgery and plans for appendectomy tonight.       Past Medical History:   Diagnosis Date    Seizures     epilepsy absent seizures       Past Surgical History:   Procedure Laterality Date    EAR RECONSTRUCTION      bilateral ears, r/t birth defect       Review of patient's allergies indicates:  No Known Allergies    No current facility-administered medications on file prior to encounter.     Current Outpatient Medications on File Prior to Encounter   Medication Sig    atorvastatin (LIPITOR) 20 MG tablet Take 20 mg by mouth every evening.    diclofenac (VOLTAREN) 50 MG EC tablet Take 1 tablet (50 mg total) by mouth 3 (three) times daily as needed.    minoxidiL  (LONITEN) 2.5 MG tablet Take 2.5 mg by mouth once daily.    naproxen (NAPROSYN) 500 MG tablet Take 1 tablet (500 mg total) by mouth 2 (two) times daily with meals.    sertraline (ZOLOFT) 50 MG tablet Take 50 mg by mouth once daily.    SF 5000 PLUS 1.1 % Crea BRUSH ON NIGHTLY AFTER NORMAL HYGIENE REGIMEN SPIT OUT EXCESS. DO NOT RINSE    sulfamethoxazole-trimethoprim 800-160mg (BACTRIM DS) 800-160 mg Tab Take 1 tablet by mouth 2 (two) times daily.    traZODone (DESYREL) 50 MG tablet Take 50 mg by mouth every evening.    VYVANSE 50 mg capsule Take 50 mg by mouth once daily.     Family History       Problem Relation (Age of Onset)    Diabetes Maternal Grandmother    Heart disease Maternal Grandmother    Mental retardation Cousin          Tobacco Use    Smoking status: Never    Smokeless tobacco: Not on file   Substance and Sexual Activity    Alcohol use: No     Comment: social drinker when not pregnant    Drug use: No    Sexual activity: Not on file     Review of Systems   All other systems reviewed and are negative.    Objective:     Vital Signs (Most Recent):  Temp: 98.5 °F (36.9 °C) (10/10/23 1708)  Pulse: 94 (10/10/23 2124)  Resp: 18 (10/10/23 2124)  BP: 124/69 (10/10/23 2124)  SpO2: 98 % (10/10/23 2124) Vital Signs (24h Range):  Temp:  [98.5 °F (36.9 °C)] 98.5 °F (36.9 °C)  Pulse:  [] 94  Resp:  [18-24] 18  SpO2:  [97 %-98 %] 98 %  BP: (124-127)/(69-84) 124/69     Weight: 68.9 kg (152 lb)  Body mass index is 26.09 kg/m².     Physical Exam  Constitutional:       Appearance: Normal appearance. She is normal weight.   HENT:      Head: Normocephalic and atraumatic.      Nose: Nose normal.      Mouth/Throat:      Mouth: Mucous membranes are moist.   Eyes:      Conjunctiva/sclera: Conjunctivae normal.   Cardiovascular:      Rate and Rhythm: Normal rate and regular rhythm.      Pulses: Normal pulses.      Heart sounds: Normal heart sounds. No murmur heard.     No friction rub. No gallop.   Pulmonary:       Effort: Pulmonary effort is normal.      Breath sounds: Normal breath sounds. No wheezing or rales.   Abdominal:      General: Abdomen is flat. Bowel sounds are normal. There is no distension.      Palpations: Abdomen is soft.      Tenderness: There is no abdominal tenderness. There is no guarding.      Comments: Moderate right lower quadrant abdominal tenderness to palpation, no rebound or guarding.  No tenderness on the left side of her abdomen.   Musculoskeletal:         General: No swelling. Normal range of motion.      Cervical back: Normal range of motion and neck supple.   Skin:     General: Skin is warm and dry.   Neurological:      General: No focal deficit present.      Mental Status: She is alert.   Psychiatric:         Mood and Affect: Mood normal.         Thought Content: Thought content normal.         Judgment: Judgment normal.                Significant Labs: All pertinent labs within the past 24 hours have been reviewed.    Significant Imaging: I have reviewed all pertinent imaging results/findings within the past 24 hours.    Assessment/Plan:     * Acute appendicitis  IV fluids  Pain control  General surgery consulted, plans for appendectomy tonight.   CT showing acute appendicitis  NPO  Hopefully home in the am.   She has significant nausea with anesthesia. PRN's ordered      VTE Risk Mitigation (From admission, onward)    None             On 10/10/2023, patient should be placed in hospital observation services under my care.        Lisandro Bob MD  Department of Hospital Medicine  Count includes the [facility] - Emergency Dept

## 2023-10-11 NOTE — ANESTHESIA POSTPROCEDURE EVALUATION
Anesthesia Post Evaluation    Patient: Lakshmi Rodriguez    Procedure(s) Performed: Procedure(s) (LRB):  APPENDECTOMY, LAPAROSCOPIC (N/A)    Final Anesthesia Type: general      Patient location during evaluation: PACU  Patient participation: Yes- Able to Participate  Level of consciousness: awake and alert and oriented  Post-procedure vital signs: reviewed and stable  Pain management: adequate  Airway patency: patent    PONV status at discharge: No PONV  Anesthetic complications: no      Cardiovascular status: blood pressure returned to baseline and hemodynamically stable  Respiratory status: unassisted, spontaneous ventilation and room air  Hydration status: euvolemic  Follow-up not needed.          Vitals Value Taken Time   /59 10/11/23 0200   Temp 36.1 °C (97 °F) 10/11/23 0116   Pulse 86 10/11/23 0212   Resp 18 10/11/23 0200   SpO2 98 % 10/11/23 0212   Vitals shown include unvalidated device data.      No case tracking events are documented in the log.      Pain/Angel Score: Angel Score: 9 (10/11/2023  2:00 AM)

## 2023-10-11 NOTE — DISCHARGE SUMMARY
[facility]  Discharge Summary      Patient Name: Lakshmi Rodriguez  MRN: [id number]  ELIO: [id number]  Patient Class: OP- Observation  Admission Date: 10/10/2023  Hospital Length of Stay: 0 days  Discharge Date and Time: 10/11/2023  1:18 PM  Attending Physician: No att. providers found   Discharging Provider: Solomon Vargas MD  Primary Care Provider: Darleen Cordon NP    Primary Care Team: Networked reference to record PCT     HPI:   Patient is a 38-year-old female with no significant past medical history who presented with 1 week of ongoing right lower quadrant abdominal pain.  She thought that she was having excessive gas and abdominal cramping and bloating.  She was folding clothes today and developed severe pain in her right lower quadrant.  She says the pain was excruciating which was what brought her to the ER.  She denies any nausea or vomiting.  No constipation or diarrhea.  She denies fever or chills.    In the ED:  Vital signs are stable, WBC 16.7, hemoglobin 14.4, platelets are 333, chemistry panel unremarkable, urinalysis unremarkable.  CT scan showing acute appendicitis.  Discussed with General surgery and plans for appendectomy tonight.       Procedure(s) (LRB):  APPENDECTOMY, LAPAROSCOPIC (N/A)      Hospital Course:   38 year old female admitted with acute appendicitis.  She underwent lap appendectomy with General surgery and has tolerated well.  Pain is controlled, she is eating and ambulating without difficulty. She wishes for ibuprofen 800mg rx for pain control which I have provided.  Rx for zofran also provided in the event she needs it, augmentin course.  Activity restrictions given. Returned precautions given. Examined on day of discharge and alert, NAD, comfortable respirationson room air, abd is soft/appropriately TTP/well approximated laparoscopic incision sites.  She will follow up with General Surgery in clinic.        Goals of Care Treatment  Preferences:  Code Status: Full Code    Living Will: Yes              Consults:   Consults (From admission, onward)        Status Ordering Provider     Inpatient consult to General Surgery  Once        Provider:  Dionicio Mast MD    Completed SHIREEN MCKENZIE          No new Assessment & Plan notes have been filed under this hospital service since the last note was generated.  Service: Hospital Medicine    Final Active Diagnoses:      Problems Resolved During this Admission:    Diagnosis Date Noted Date Resolved POA    PRINCIPAL PROBLEM:  Acute appendicitis [K35.80] 10/10/2023 10/11/2023 Yes       Discharged Condition: good    Disposition: Home or Self Care    Follow Up:   Follow-up Information     Dionicio Mast MD. Schedule an appointment as soon as possible for a visit in 2 week(s).    Specialties: General Surgery, Colon and Rectal Surgery, Surgery  Why: post surgery discharge follow up after appendectomy  Contact information:  [address] [facility]  [address]  [facility] [address]  [phone]                       Patient Instructions:      Diet Adult Regular     Other restrictions (specify):   Order Comments: Do not lift greater than 5-10lbs until cleared by General Surgery  May shower but do not submerse yourself in water such as bath, pool, etc., until cleared by General Surgery.       Significant Diagnostic Studies: Labs:   CMP   Recent Labs   Lab 10/10/23  1728 10/11/23  0444    135*  135*   K 3.7 3.7  3.7    104  104   CO2 24 23  23   GLU 89 158*  158*   BUN 7 9  9   CREATININE 0.7 0.7  0.7   CALCIUM 9.5 8.9  8.9   PROT 7.5 7.1   ALBUMIN 4.7 4.3   BILITOT 0.7 0.5   ALKPHOS 52* 48*   AST 13 11   ALT 13 11   ANIONGAP 8 8  8    and CBC   Recent Labs   Lab 10/10/23  1728 10/11/23  0444   WBC 16.76* 16.78*  16.78*   HGB 14.4 13.3  13.3   HCT 43.0 40.2  40.2    294  294       Pending Diagnostic Studies:     Procedure Component Value Units Date/Time    Specimen to Pathology -  Surgery [[id number]] Collected: 10/11/23 0058    Order Status: Sent Lab Status: No result     Specimen: Tissue          Medications:  Reconciled Home Medications:      Medication List      START taking these medications    amoxicillin-clavulanate 500-125mg 500-125 mg Tab  Commonly known as: AUGMENTIN  Take 1 tablet (500 mg total) by mouth 2 (two) times daily. for 5 days     ibuprofen 800 MG tablet  Commonly known as: ADVIL,MOTRIN  Take 1 tablet (800 mg total) by mouth 3 (three) times daily as needed for Pain.     ondansetron 4 MG tablet  Commonly known as: ZOFRAN  Take 1 tablet (4 mg total) by mouth every 6 (six) hours as needed for Nausea.        CONTINUE taking these medications    atorvastatin 20 MG tablet  Commonly known as: LIPITOR  Take 20 mg by mouth every evening.     diclofenac 50 MG EC tablet  Commonly known as: VOLTAREN  Take 1 tablet (50 mg total) by mouth 3 (three) times daily as needed.     minoxidiL 2.5 MG tablet  Commonly known as: LONITEN  Take 2.5 mg by mouth once daily.     sertraline 50 MG tablet  Commonly known as: ZOLOFT  Take 50 mg by mouth once daily.     SF 5000 PLUS 1.1 % Crea  Generic drug: fluoride (sodium)  BRUSH ON NIGHTLY AFTER NORMAL HYGIENE REGIMEN SPIT OUT EXCESS. DO NOT RINSE     traZODone 50 MG tablet  Commonly known as: DESYREL  Take 50 mg by mouth every evening.     VYVANSE 50 MG capsule  Generic drug: lisdexamfetamine  Take 50 mg by mouth once daily.            Indwelling Lines/Drains at time of discharge:   Lines/Drains/Airways     None                 Time spent on the discharge of patient: 33 minutes         Solomon Vargas MD  Department of Hospital Medicine  [facility]

## 2023-10-11 NOTE — SUBJECTIVE & OBJECTIVE
No current facility-administered medications on file prior to encounter.     Current Outpatient Medications on File Prior to Encounter   Medication Sig    atorvastatin (LIPITOR) 20 MG tablet Take 20 mg by mouth every evening.    minoxidiL (LONITEN) 2.5 MG tablet Take 2.5 mg by mouth once daily.    sertraline (ZOLOFT) 50 MG tablet Take 50 mg by mouth once daily.    traZODone (DESYREL) 50 MG tablet Take 50 mg by mouth every evening.    VYVANSE 50 mg capsule Take 50 mg by mouth once daily.    diclofenac (VOLTAREN) 50 MG EC tablet Take 1 tablet (50 mg total) by mouth 3 (three) times daily as needed.    SF 5000 PLUS 1.1 % Crea BRUSH ON NIGHTLY AFTER NORMAL HYGIENE REGIMEN SPIT OUT EXCESS. DO NOT RINSE    [DISCONTINUED] naproxen (NAPROSYN) 500 MG tablet Take 1 tablet (500 mg total) by mouth 2 (two) times daily with meals.    [DISCONTINUED] sulfamethoxazole-trimethoprim 800-160mg (BACTRIM DS) 800-160 mg Tab Take 1 tablet by mouth 2 (two) times daily.       Review of patient's allergies indicates:   Allergen Reactions    Anesthetics - devon type- parabens Nausea Only       Past Medical History:   Diagnosis Date    Seizures     epilepsy absent seizures     Past Surgical History:   Procedure Laterality Date    EAR RECONSTRUCTION      bilateral ears, r/t birth defect     Family History       Problem Relation (Age of Onset)    Diabetes Maternal Grandmother    Heart disease Maternal Grandmother    Mental retardation Cousin          Tobacco Use    Smoking status: Never    Smokeless tobacco: Not on file   Substance and Sexual Activity    Alcohol use: No     Comment: social drinker when not pregnant    Drug use: No    Sexual activity: Not on file     Review of Systems   Constitutional:  Negative for activity change and appetite change.   Cardiovascular:  Negative for chest pain.   Gastrointestinal:  Positive for abdominal pain. Negative for abdominal distention, nausea and vomiting.   Skin:  Negative for color change.      Objective:     Vital Signs (Most Recent):  Temp: 98.5 °F (36.9 °C) (10/10/23 1708)  Pulse: 94 (10/10/23 2124)  Resp: 18 (10/10/23 2124)  BP: 124/69 (10/10/23 2124)  SpO2: 98 % (10/10/23 2124) Vital Signs (24h Range):  Temp:  [98.5 °F (36.9 °C)] 98.5 °F (36.9 °C)  Pulse:  [] 94  Resp:  [18-24] 18  SpO2:  [97 %-98 %] 98 %  BP: (124-127)/(69-84) 124/69     Weight: 68.9 kg (152 lb)  Body mass index is 26.09 kg/m².     Physical Exam  Vitals reviewed.   Cardiovascular:      Rate and Rhythm: Normal rate.      Pulses: Normal pulses.   Pulmonary:      Effort: Pulmonary effort is normal. No respiratory distress.      Breath sounds: No wheezing.   Abdominal:      General: Abdomen is flat. There is no distension.      Tenderness: There is abdominal tenderness. There is no guarding or rebound.   Neurological:      Mental Status: She is alert.   Psychiatric:         Mood and Affect: Mood normal.            I have reviewed all pertinent lab results within the past 24 hours.  CBC:   Recent Labs   Lab 10/10/23  1728   WBC 16.76*   RBC 4.83   HGB 14.4   HCT 43.0      MCV 89   MCH 29.8   MCHC 33.5     BMP:   Recent Labs   Lab 10/10/23  1728   GLU 89      K 3.7      CO2 24   BUN 7   CREATININE 0.7   CALCIUM 9.5       Significant Diagnostics:  Ct scan reviewed and with findings consistent with acute appendicitis.

## 2023-10-11 NOTE — HPI
Patient is a 38-year-old female with no significant past medical history who presented with 1 week of ongoing right lower quadrant abdominal pain.  She thought that she was having excessive gas and abdominal cramping and bloating.  She was folding clothes today and developed severe pain in her right lower quadrant.  She says the pain was excruciating which was what brought her to the ER.  She denies any nausea or vomiting.  No constipation or diarrhea.  She denies fever or chills.    In the ED:  Vital signs are stable, WBC 16.7, hemoglobin 14.4, platelets are 333, chemistry panel unremarkable, urinalysis unremarkable.  CT scan showing acute appendicitis.  Discussed with General surgery and plans for appendectomy tonight.

## 2023-10-11 NOTE — SUBJECTIVE & OBJECTIVE
Past Medical History:   Diagnosis Date    Seizures     epilepsy absent seizures       Past Surgical History:   Procedure Laterality Date    EAR RECONSTRUCTION      bilateral ears, r/t birth defect       Review of patient's allergies indicates:  No Known Allergies    No current facility-administered medications on file prior to encounter.     Current Outpatient Medications on File Prior to Encounter   Medication Sig    atorvastatin (LIPITOR) 20 MG tablet Take 20 mg by mouth every evening.    diclofenac (VOLTAREN) 50 MG EC tablet Take 1 tablet (50 mg total) by mouth 3 (three) times daily as needed.    minoxidiL (LONITEN) 2.5 MG tablet Take 2.5 mg by mouth once daily.    naproxen (NAPROSYN) 500 MG tablet Take 1 tablet (500 mg total) by mouth 2 (two) times daily with meals.    sertraline (ZOLOFT) 50 MG tablet Take 50 mg by mouth once daily.    SF 5000 PLUS 1.1 % Crea BRUSH ON NIGHTLY AFTER NORMAL HYGIENE REGIMEN SPIT OUT EXCESS. DO NOT RINSE    sulfamethoxazole-trimethoprim 800-160mg (BACTRIM DS) 800-160 mg Tab Take 1 tablet by mouth 2 (two) times daily.    traZODone (DESYREL) 50 MG tablet Take 50 mg by mouth every evening.    VYVANSE 50 mg capsule Take 50 mg by mouth once daily.     Family History       Problem Relation (Age of Onset)    Diabetes Maternal Grandmother    Heart disease Maternal Grandmother    Mental retardation Cousin          Tobacco Use    Smoking status: Never    Smokeless tobacco: Not on file   Substance and Sexual Activity    Alcohol use: No     Comment: social drinker when not pregnant    Drug use: No    Sexual activity: Not on file     Review of Systems   All other systems reviewed and are negative.    Objective:     Vital Signs (Most Recent):  Temp: 98.5 °F (36.9 °C) (10/10/23 1708)  Pulse: 94 (10/10/23 2124)  Resp: 18 (10/10/23 2124)  BP: 124/69 (10/10/23 2124)  SpO2: 98 % (10/10/23 2124) Vital Signs (24h Range):  Temp:  [98.5 °F (36.9 °C)] 98.5 °F (36.9 °C)  Pulse:  [] 94  Resp:  [18-24]  18  SpO2:  [97 %-98 %] 98 %  BP: (124-127)/(69-84) 124/69     Weight: 68.9 kg (152 lb)  Body mass index is 26.09 kg/m².     Physical Exam  Constitutional:       Appearance: Normal appearance. She is normal weight.   HENT:      Head: Normocephalic and atraumatic.      Nose: Nose normal.      Mouth/Throat:      Mouth: Mucous membranes are moist.   Eyes:      Conjunctiva/sclera: Conjunctivae normal.   Cardiovascular:      Rate and Rhythm: Normal rate and regular rhythm.      Pulses: Normal pulses.      Heart sounds: Normal heart sounds. No murmur heard.     No friction rub. No gallop.   Pulmonary:      Effort: Pulmonary effort is normal.      Breath sounds: Normal breath sounds. No wheezing or rales.   Abdominal:      General: Abdomen is flat. Bowel sounds are normal. There is no distension.      Palpations: Abdomen is soft.      Tenderness: There is no abdominal tenderness. There is no guarding.      Comments: Moderate right lower quadrant abdominal tenderness to palpation, no rebound or guarding.  No tenderness on the left side of her abdomen.   Musculoskeletal:         General: No swelling. Normal range of motion.      Cervical back: Normal range of motion and neck supple.   Skin:     General: Skin is warm and dry.   Neurological:      General: No focal deficit present.      Mental Status: She is alert.   Psychiatric:         Mood and Affect: Mood normal.         Thought Content: Thought content normal.         Judgment: Judgment normal.                Significant Labs: All pertinent labs within the past 24 hours have been reviewed.    Significant Imaging: I have reviewed all pertinent imaging results/findings within the past 24 hours.

## 2023-10-11 NOTE — HPI
Pleasant 38 year female who presented to the ER this evening with worsening abdominal pain that localized to the right lower quadrant.  Patient notes she began with pain yesterday centralized in the belly.  Throughout the day today it began to localize into the right lower quadrant.  She came to the ER this evening secondary to the pain.  CT scan in the ER demonstrated findings consistent with acute appendicitis.  Surgery was consulted.  Patient has no significant medical history.  Her past surgical history significant for  x2.

## 2023-10-11 NOTE — ANESTHESIA PROCEDURE NOTES
Intubation    Date/Time: 10/11/2023 12:27 AM    Performed by: Angel Khanna CRNA  Authorized by: Jakob Arias MD    Intubation:     Induction:  Intravenous    Intubated:  Postinduction    Mask Ventilation:  Easy with oral airway    Attempts:  1    Attempted By:  CRNA    Method of Intubation:  Direct    Blade:  Richards 3    Laryngeal View Grade: Grade I - full view of cords      Difficult Airway Encountered?: No      Complications:  None    Airway Device:  Oral endotracheal tube    Airway Device Size:  7.0    Style/Cuff Inflation:  Cuffed    Inflation Amount (mL):  8    Tube secured:  22    Secured at:  The lips    Placement Verified By:  Capnometry and other (see comments) (direct visualization)    Complicating Factors:  None    Findings Post-Intubation:  BS equal bilateral and atraumatic/condition of teeth unchanged  Notes:      Smooth induction, atraumatic intubation, dentition/lips intact and unchanged, zero complications

## 2023-10-11 NOTE — TRANSFER OF CARE
"Anesthesia Transfer of Care Note    Patient: Lakshmi Rodriguez    Procedure(s) Performed: Procedure(s) (LRB):  APPENDECTOMY, LAPAROSCOPIC (N/A)    Patient location: PACU    Anesthesia Type: general    Transport from OR: Transported from OR on room air with adequate spontaneous ventilation    Post pain: adequate analgesia    Post assessment: no apparent anesthetic complications and tolerated procedure well    Post vital signs: stable    Level of consciousness: responds to stimulation and awake    Nausea/Vomiting: no nausea/vomiting    Complications: none    Transfer of care protocol was followed      Last vitals:   Visit Vitals  /69   Pulse 94   Temp 36.9 °C (98.5 °F) (Oral)   Resp 18   Ht 5' 4" (1.626 m)   Wt 68.9 kg (152 lb)   LMP 09/17/2023   SpO2 98%   BMI 26.09 kg/m²     "

## 2023-10-11 NOTE — ED PROVIDER NOTES
Encounter Date: 10/10/2023       History     Chief Complaint   Patient presents with    Flank Pain    Abdominal Pain     Right sided abd pain and flank pain x several days that had worsened     HPI  Patient is a 38-year-old female with a history of epilepsy who is presenting with 1 week+ history of vomiting, abdominal pain, and diarrhea.  Per patient, she developed vomiting last week which resolved and also developed diarrhea which resolved 2 days ago but states she continued to have lower abdominal pain and right flank pain.  Patient states she had been taking Pepto-Bismol for her abdominal symptoms with little relief.  Patient describes her pain as 5/10 and reports it is manageable.  Patient denies fever but states she had chills today.  Patient denies urinary symptoms.    Review of patient's allergies indicates:   Allergen Reactions    Anesthetics - devon type- parabens Nausea Only     Past Medical History:   Diagnosis Date    Seizures     epilepsy absent seizures     Past Surgical History:   Procedure Laterality Date    EAR RECONSTRUCTION      bilateral ears, r/t birth defect     Family History   Problem Relation Age of Onset    Diabetes Maternal Grandmother     Heart disease Maternal Grandmother     Mental retardation Cousin      Social History     Tobacco Use    Smoking status: Never   Substance Use Topics    Alcohol use: No     Comment: social drinker when not pregnant    Drug use: No     Review of Systems   Gastrointestinal:  Positive for abdominal pain.       Physical Exam     Initial Vitals [10/10/23 1708]   BP Pulse Resp Temp SpO2   127/84 100 (!) 24 98.5 °F (36.9 °C) 97 %      MAP       --         Physical Exam    Constitutional: She appears well-developed and well-nourished.   HENT:   Head: Normocephalic and atraumatic.   Right Ear: External ear normal.   Left Ear: External ear normal.   Eyes: Conjunctivae and EOM are normal.   Neck:   Normal range of motion.  Cardiovascular:  Normal rate, regular  rhythm and normal heart sounds.           Pulmonary/Chest: Breath sounds normal.   Abdominal: Abdomen is soft. There is abdominal tenderness.   Bilateral lower abdominal tenderness.  +McBurney's point tenderness.  +Rovsing sign   Musculoskeletal:         General: Normal range of motion.      Cervical back: Normal range of motion.     Neurological: She is alert and oriented to person, place, and time.   Skin: Skin is warm.         ED Course   Procedures  Labs Reviewed   CBC W/ AUTO DIFFERENTIAL - Abnormal; Notable for the following components:       Result Value    WBC 16.76 (*)     Gran # (ANC) 13.2 (*)     Immature Grans (Abs) 0.06 (*)     Gran % 78.6 (*)     Lymph % 15.2 (*)     All other components within normal limits   COMPREHENSIVE METABOLIC PANEL - Abnormal; Notable for the following components:    Alkaline Phosphatase 52 (*)     All other components within normal limits   LIPASE   URINALYSIS, REFLEX TO URINE CULTURE    Narrative:     Specimen Source->Urine   POCT URINE PREGNANCY          Imaging Results              CT Abdomen Pelvis With Contrast (Final result)  Result time 10/10/23 21:13:08      Final result by Jakob Shoemaker MD (10/10/23 21:13:08)                   Narrative:    CT SCANS ABDOMEN AND PELVIS WITH IV CONTRAST.    HISTORY: Flank pain, kidney stone ; RLQ abdominal pain    COMPARISON: January 2, 2022.    TECHNIQUE: Radiation dose reduction techniques were utilized, including automated exposure control and exposure modulation based on body size. Axial images were obtained from the lung bases to the symphysis pubis with IV contrast only.. Oral contrast was not administered per request.    FINDINGS :  Appendix enlarged at 12 mm with associated enhancement and adjacent inflammation compatible with early acute uncomplicated appendicitis. There is a minimal amount of reactive free fluid without evidence of abscess or bowel obstruction on this exam without oral contrast.    There is a  nonobstructing lower pole right renal calculus. Remaining abdominal organs have an unremarkable appearance. Normal aorta. There are small bilateral ovarian cysts not requiring imaging follow-up.    IMPRESSION :  1. Acute uncomplicated appendicitis.  2. Right nephrolithiasis, nonobstructing      Electronically signed by:  Jakob Shoemaker MD  10/10/2023 09:13 PM CDT Workstation: [id number]                                     Medications   iohexoL (OMNIPAQUE 350) injection 100 mL (100 mLs Intravenous Given 10/10/23 2049)     Medical Decision Making  Amount and/or Complexity of Data Reviewed  Radiology: ordered. Decision-making details documented in ED Course.    Risk  Prescription drug management.    Patient is a 38-year-old woman with lower abdominal pain.  Upon arrival to the ED, VSS and nontoxic appearing.  Physical exam notable for right CVA tenderness, bilateral lower abdominal tenderness, McBurney point tenderness, and positive for Rovsing sign.  Initial differential included but not limited to appendicitis, nephrolithiasis, gastroenteritis, infectious etiology.  Workup included CBC, CMP, CT AP.  Labs were significant for leukocytosis of 16.76 CT AP showed acute uncomplicated appendicitis and right nonobstructing nephrolithiasis.  Patient stated her pain is manageable and opted out of pain medication administration at this time.  Patient consulted to General surgery for appendicitis and admitted to Hospital Medicine.          Attending Attestation:   Physician Attestation Statement for Resident:  As the supervising MD   I agree with the above history.  -:   As the supervising MD I agree with the above PE.     As the supervising MD I agree with the above treatment, course, plan, and disposition.    I have reviewed and agree with the residents interpretation of the following: lab data and CT scans.                 ED Course as of 10/10/23 2209   Tu Oct 10, 2023   2122 CT Abdomen Pelvis With Contrast  IMPRESSION  :  1. Acute uncomplicated appendicitis.  2. Right nephrolithiasis, nonobstructing [AD]      ED Course User Index  [AD] Rudy Lozano MD                    Clinical Impression:   Final diagnoses:  [K35.30] Acute appendicitis with localized peritonitis, without perforation, abscess, or gangrene  [N20.0] Nephrolithiasis        ED Disposition Condition    Observation                 Rudy Lozano MD  Resident  10/10/23 [id number]       Thompson Patel MD  10/11/23 1508

## 2023-10-11 NOTE — PLAN OF CARE
Patient taken to room via stretcher at this time. Not distressful. MARGIE Castaneda at bedside to assume care of patient.

## 2023-10-11 NOTE — NURSING
Nurses Note -- 4 Eyes      10/11/2023   3:27 AM      Skin assessed during: Admit      [] No Altered Skin Integrity Present    []Prevention Measures Documented      [x] Yes- Altered Skin Integrity Present or Discovered   [x] LDA Added if Not in Epic (Describe Wound)   [x] New Altered Skin Integrity was Present on Admit and Documented in LDA   [x] Wound Image Taken    Wound Care Consulted? Yes    Attending Nurse:  Leonel Soriano RN    Second RN/Staff Member:   [id number]

## 2023-10-11 NOTE — PLAN OF CARE
[facility]  Initial Discharge Assessment    Assessment completed at bedside with patient, demographics verified.  Patient lives at home with her  and children and is fully independent with ADL's. Has follow up appt with surgeon.  No post-acute needs expressed or identified at this time.     Primary Care Provider: Darleen Cordon NP    Admission Diagnosis: Abdominal pain [R10.9]  Acute appendicitis with localized peritonitis, without perforation, abscess, or gangrene [K35.30]    Admission Date: 10/10/2023  Expected Discharge Date: 10/11/2023    Transition of Care Barriers: Underinsured    Payor: MEDICAID / Plan: [facility] COMMUNITY PLAN [facility] ([place] MEDICAID) / Product Type: Managed Medicaid /     Extended Emergency Contact Information  Primary Emergency Contact: Harvey Mora  Address: [address] [place] [address] [place] Kirk [address] [facility]  Home Phone: [phone]  Mobile Phone: [phone]  Relation: Spouse  Secondary Emergency Contact: Haylee Kimbrough  Mobile Phone: [phone]  Relation: Friend    Discharge Plan A: Home with family  Discharge Plan B: Home with family      [facility] [address] [place] [address] [employer]  [address] [facility] [address]  Phone: [phone] Fax: [phone]      Initial Assessment (most recent)       Adult Discharge Assessment - 10/11/23 1014          Discharge Assessment    Assessment Type Discharge Planning Assessment     Confirmed/corrected address, phone number and insurance Yes     Confirmed Demographics Correct on Facesheet     Source of Information patient     People in Home spouse;child(jessica), dependent     Facility Arrived From: Home     Do you expect to return to your current living situation? Yes     Prior to hospitilization cognitive status: Alert/Oriented;No Deficits     Current cognitive status: Alert/Oriented;No Deficits     Equipment Currently Used at Home none     Readmission within 30 days? No     Patient  currently being followed by outpatient case management? No     Do you currently have service(s) that help you manage your care at home? No     Do you have prescription coverage? Yes     Coverage [place] Medicaid- C     How do you get to doctors appointments? car, drives self     Are you on dialysis? No     Do you take coumadin? No     DME Needed Upon Discharge  none     Discharge Plan discussed with: Patient     Transition of Care Barriers Underinsured     Discharge Plan A Home with family     Discharge Plan B Home with family

## 2023-10-11 NOTE — PLAN OF CARE
Scopalomine patch placed by anesthesia preop behind left ear. Patch is not on patient after arrival to PACU, anesthesia aware.

## 2023-10-11 NOTE — BRIEF OP NOTE
[facility]  Brief Operative Note    SUMMARY     Surgery Date: 10/10/2023     Surgeon(s) and Role:     * Dionicio Mast MD - Primary    Assisting Surgeon: None    Pre-op Diagnosis:  Abdominal pain [R10.9]    Post-op Diagnosis:  Post-Op Diagnosis Codes:     * Abdominal pain [R10.9]    Procedure(s) (LRB):  APPENDECTOMY, LAPAROSCOPIC (N/A)    Anesthesia: General    Implants:  * No implants in log *    Operative Findings: DIstended appendix    Estimated Blood Loss: 25 cc's      Estimated Blood Loss has been documented.         Specimens:   Specimen (24h ago, onward)       Start     Ordered    10/11/23 0058  Specimen to Pathology - Surgery  Once        Comments: Pre-op Diagnosis: Abdominal pain [R10.9]Procedure(s):APPENDECTOMY, LAPAROSCOPIC Number of specimens: 1Name of specimens: appendix     Question:  Release to patient  Answer:  Immediate    10/11/23 0057                    [id number]

## 2023-10-11 NOTE — PLAN OF CARE
Patient has post-op appt with surgeon.  Discharge orders and chart reviewed with no further post-acute discharge needs identified at this time.  At this time, patient is cleared for discharge from Case Management standpoint.        10/11/23 1148   Final Note   Assessment Type Final Discharge Note   Anticipated Discharge Disposition Home   Post-Acute Status   Post-Acute Authorization Other   Other Status No Post-Acute Service Needs   Discharge Delays None known at this time

## 2023-10-11 NOTE — ANESTHESIA PREPROCEDURE EVALUATION
10/10/2023  Lakshmi Rodriguez is a 38 y.o., female.        Patient Active Problem List   Diagnosis    *Acute respiratory failure    Aspiration pneumonia    ARDS (adult respiratory distress syndrome)    Acute appendicitis       Past Surgical History:   Procedure Laterality Date    EAR RECONSTRUCTION      bilateral ears, r/t birth defect        Tobacco Use:  The patient  reports that she has never smoked. She does not have any smokeless tobacco history on file.     No results found for this or any previous visit.     Imaging Results          CT Abdomen Pelvis With Contrast (Final result)  Result time 10/10/23 21:13:08    Final result by Jakob Shoemaker MD (10/10/23 21:13:08)                 Narrative:    CT SCANS ABDOMEN AND PELVIS WITH IV CONTRAST.    HISTORY: Flank pain, kidney stone ; RLQ abdominal pain    COMPARISON: January 2, 2022.    TECHNIQUE: Radiation dose reduction techniques were utilized, including automated exposure control and exposure modulation based on body size. Axial images were obtained from the lung bases to the symphysis pubis with IV contrast only.. Oral contrast was not administered per request.    FINDINGS :  Appendix enlarged at 12 mm with associated enhancement and adjacent inflammation compatible with early acute uncomplicated appendicitis. There is a minimal amount of reactive free fluid without evidence of abscess or bowel obstruction on this exam without oral contrast.    There is a nonobstructing lower pole right renal calculus. Remaining abdominal organs have an unremarkable appearance. Normal aorta. There are small bilateral ovarian cysts not requiring imaging follow-up.    IMPRESSION :  1. Acute uncomplicated appendicitis.  2. Right nephrolithiasis, nonobstructing      Electronically signed by:  Jakob Shoemaker MD  10/10/2023 09:13 PM CDT Workstation: [id number]                                Lab Results   Component Value Date    WBC 16.76 (H) 10/10/2023    HGB 14.4 10/10/2023    HCT 43.0 10/10/2023    MCV 89 10/10/2023     10/10/2023     BMP  Lab Results   Component Value Date     10/10/2023    K 3.7 10/10/2023     10/10/2023    CO2 24 10/10/2023    BUN 7 10/10/2023    CREATININE 0.7 10/10/2023    CALCIUM 9.5 10/10/2023    ANIONGAP 8 10/10/2023    GLU 89 10/10/2023    GLU 98 01/02/2022       No results found for this or any previous visit.            Pre-op Assessment    I have reviewed the Patient Summary Reports.     I have reviewed the Nursing Notes. I have reviewed the NPO Status.   I have reviewed the Medications.     Review of Systems  Anesthesia Hx:  No problems with previous Anesthesia  Denies Family Hx of Anesthesia complications.  Personal Hx of Anesthesia complications, Post-Operative Nausea/Vomiting, with every anesthetic, treatment not known   Social:  Non-Smoker    Hematology/Oncology:  Hematology Normal        Cardiovascular:  Cardiovascular Normal     Pulmonary:   Pneumonia (hx of aspiration pneumonia )    Hepatic/GI:  Hepatic/GI Normal    Musculoskeletal:  Musculoskeletal Normal    Neurological:   Seizures, well controlled    Endocrine:  Endocrine Normal        Physical Exam  General: Well nourished, Cooperative, Alert and Oriented    Airway:  Mallampati: III / II  Mouth Opening: Normal  TM Distance: Normal  Tongue: Normal  Neck ROM: Normal ROM    Dental:  Intact    Chest/Lungs:  Clear to auscultation    Heart:  Rate: Normal  Rhythm: Regular Rhythm  Sounds: Normal    Abdomen:  Normal, Soft, Nontender        Anesthesia Plan  Type of Anesthesia, risks & benefits discussed:    Anesthesia Type: Gen ETT  Intra-op Monitoring Plan: Standard ASA Monitors  Post Op Pain Control Plan: multimodal analgesia and IV/PO Opioids PRN  Induction:  IV  Airway Plan: Video, Post-Induction  Informed Consent: Informed consent signed with the Patient and all parties  understand the risks and agree with anesthesia plan.  All questions answered.   ASA Score: 3 Emergent  Anesthesia Plan Notes:     GETA  Scopolamine patch applied in holding  IV tylenol  Zofran Pepcid 20, Benadryl 6.25, Decadron 4    Ready For Surgery From Anesthesia Perspective.     .

## 2023-10-11 NOTE — ASSESSMENT & PLAN NOTE
IV fluids  Pain control  General surgery consulted, plans for appendectomy tonight.   CT showing acute appendicitis  NPO  Hopefully home in the am.   She has significant nausea with anesthesia. PRN's ordered

## 2023-10-31 ENCOUNTER — OFFICE VISIT (OUTPATIENT)
Dept: SURGERY | Facility: CLINIC | Age: 38
End: 2023-10-31
Payer: MEDICAID

## 2023-10-31 VITALS
WEIGHT: 152.13 LBS | TEMPERATURE: 98 F | SYSTOLIC BLOOD PRESSURE: 114 MMHG | HEIGHT: 64 IN | BODY MASS INDEX: 25.97 KG/M2 | HEART RATE: 88 BPM | DIASTOLIC BLOOD PRESSURE: 70 MMHG

## 2023-10-31 DIAGNOSIS — Z09 POSTOP CHECK: Primary | ICD-10-CM

## 2023-10-31 PROCEDURE — 99024 PR POST-OP FOLLOW-UP VISIT: ICD-10-PCS | Mod: ,,, | Performed by: SURGERY

## 2023-10-31 PROCEDURE — 1159F MED LIST DOCD IN RCRD: CPT | Mod: CPTII,,, | Performed by: SURGERY

## 2023-10-31 PROCEDURE — 3078F DIAST BP <80 MM HG: CPT | Mod: CPTII,,, | Performed by: SURGERY

## 2023-10-31 PROCEDURE — 1159F PR MEDICATION LIST DOCUMENTED IN MEDICAL RECORD: ICD-10-PCS | Mod: CPTII,,, | Performed by: SURGERY

## 2023-10-31 PROCEDURE — 99213 OFFICE O/P EST LOW 20 MIN: CPT | Mod: PBBFAC,PN | Performed by: SURGERY

## 2023-10-31 PROCEDURE — 3074F PR MOST RECENT SYSTOLIC BLOOD PRESSURE < 130 MM HG: ICD-10-PCS | Mod: CPTII,,, | Performed by: SURGERY

## 2023-10-31 PROCEDURE — 99999 PR PBB SHADOW E&M-EST. PATIENT-LVL III: CPT | Mod: PBBFAC,,, | Performed by: SURGERY

## 2023-10-31 PROCEDURE — 99024 POSTOP FOLLOW-UP VISIT: CPT | Mod: ,,, | Performed by: SURGERY

## 2023-10-31 PROCEDURE — 3078F PR MOST RECENT DIASTOLIC BLOOD PRESSURE < 80 MM HG: ICD-10-PCS | Mod: CPTII,,, | Performed by: SURGERY

## 2023-10-31 PROCEDURE — 99999 PR PBB SHADOW E&M-EST. PATIENT-LVL III: ICD-10-PCS | Mod: PBBFAC,,, | Performed by: SURGERY

## 2023-10-31 PROCEDURE — 3074F SYST BP LT 130 MM HG: CPT | Mod: CPTII,,, | Performed by: SURGERY

## 2023-10-31 RX ORDER — MULTIVITAMIN
1 TABLET ORAL DAILY
COMMUNITY

## 2023-10-31 NOTE — PROGRESS NOTES
Cc: post op    HPI: 38 y.o.  female  2.5 weeks s/p lap appendectomy.   Pt doing well.  No pain or complaints     PE: AFVSS    AAOx3  CTA  Soft/NT/nd  Inc: c/d/i        Path: APPENDIX, APPENDECTOMY   - ACUTE APPENDICITIS    A/P:   Pt doing well post surgery.   F/U with me prn

## 2024-08-02 DIAGNOSIS — G40.A19 INTRACTABLE ABSENCE EPILEPSY WITHOUT STATUS EPILEPTICUS: Primary | ICD-10-CM

## 2025-02-21 ENCOUNTER — TELEPHONE (OUTPATIENT)
Facility: CLINIC | Age: 40
End: 2025-02-21
Payer: MEDICAID

## 2025-02-21 DIAGNOSIS — R53.83 TIRED: Primary | ICD-10-CM

## 2025-02-21 NOTE — TELEPHONE ENCOUNTER
----- Message from Vandana sent at 2/20/2025 11:40 AM CST -----  Pt's aunt Alesha Castro MRN [id number] calling to make an appt for her. Dr. EAGLE told her to call and make an appt, pt is always tired and has seizures. We have no recordsCb [phone] (pt) [phone] (Alesha)

## 2025-02-21 NOTE — TELEPHONE ENCOUNTER
Referral was placed. I spoke with Dr. Kim prior to placing referral. He agreed to see pt. See if pt has recent labs.

## 2025-02-21 NOTE — NURSING
Oncology Navigation   Intake  Cancer Type: Benign hem  Type of Referral: Internal  Date of Referral: 02/21/25  Initial Nurse Navigator Contact: 02/21/25  Referral to Initial Contact Timeline (days): 0  Appointment Date: 03/14/25     Treatment                              Acuity      Follow Up  No follow-ups on file.     Self referral to see Dr. Kim for being tired. Okay per Dr. Kim. Appt date time and locaiton was given to pt. She verbalized understanding.

## 2025-03-13 PROBLEM — R53.83 FATIGUE: Status: ACTIVE | Noted: 2025-03-13

## 2025-03-13 NOTE — PROGRESS NOTES
"[facility]  History & Physical    Subjective:      Patient ID:   NAME: Lakshmi Rodriguez : 1985     40 y.o. female    Referring Doc: Self, Aaareferral  Other Physicians: Ben; Joe ; Andreea; Peyton De La Cruz NP (PCP)        Chief Complaint: fatigue      HPI:  40 y.o. female with diagnosis of fatigue who is a self-referral for evaluation by medical hematology/oncology. She has a history of epilepsy. She is here with her grand-mom.    She has seizure disorder for her entire life. She takes OTC iron and B-complex po daily.     Her mom has chronic anemia issues.     She is a primary patient of Peyton De La Cruz NP    She is followed by Jennifer Thomson NP with [facility] in [place] and they are planning MRI scans in future. She takes her seizure medicine (keppra) only periodically. She has "absent" seizures which usually only last a couple of mins and occurs every couple of months    She has had breast implants since 2016 with Dr Geno Fleming - she does not have any problems with them. 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    No tobacco and only drinks socially                  ROS:   GEN: normal without any fever, night sweats or weight loss  HEENT: normal with no HA's, sore throat, stiff neck, changes in vision  CV: normal with no CP, SOB, PND, GARCIA or orthopnea  PULM: normal with no SOB, cough, hemoptysis, sputum or pleuritic pain  GI: normal with no abdominal pain, nausea, vomiting, constipation, diarrhea, melanotic stools, BRBPR, or hematemesis  : normal with no hematuria, dysuria  BREAST: normal with no mass, discharge, pain  SKIN: normal with no rash, erythema, bruising, or swelling       Past Medical/Surgical History:  Past Medical History:   Diagnosis Date    Fatigue 2025    Seizures     epilepsy absent seizures     Past Surgical History:   Procedure Laterality Date    EAR RECONSTRUCTION      bilateral ears, r/t birth " defect    LAPAROSCOPIC APPENDECTOMY N/A 10/10/2023    Procedure: APPENDECTOMY, LAPAROSCOPIC;  Surgeon: Dionicio Mast MD;  Location: [facility];  Service: General;  Laterality: N/A;         Allergies:  Review of patient's allergies indicates:   Allergen Reactions    Anesthetics - devon type- parabens Nausea Only       Social/Family History:  Social History[1]  Family History   Problem Relation Name Age of Onset    Diabetes Maternal Grandmother      Heart disease Maternal Grandmother      Intellectual disability Cousin           Medications:  Current Medications[2]      Pathology:   Cancer Staging   No matching staging information was found for the patient.        Objective:   Vitals:  There were no vitals taken for this visit.    Physical Examination:   GEN: no apparent distress, comfortable; AAOx3  HEAD: atraumatic and normocephalic  EYES: no pallor, no icterus, PERRLA  ENT: OMM, no pharyngeal erythema, external ears WNL; no nasal discharge; no thrush  NECK: no masses, thyroid normal, trachea midline, no LAD/LN's, supple  CV: RRR with no murmur; normal pulse; normal S1 and S2; no pedal edema  CHEST: Normal respiratory effort; CTAB; normal breath sounds; no wheeze or crackles  ABDOM: nontender and nondistended; soft; normal bowel sounds; no rebound/guarding  MUSC/Skeletal: ROM normal; no crepitus; joints normal; no deformities or arthropathy  EXTREM: no clubbing, cyanosis, inflammation or swelling  SKIN: no rashes, lesions, ulcers, petechiae or subcutaneous nodules; tattoos   : no rodriguez  NEURO: grossly intact; motor/sensory WNL; AAOx3; no tremors  PSYCH: normal mood, affect and behavior  LYMPH: normal cervical, supraclavicular, axillary and groin LN's      Labs:     None available since 2023    Radiology/Diagnostic Studies:          All lab results and imaging results have been reviewed and discussed with the patient    Assessment:   (1) 40 y.o. female with diagnosis of fatigue who is a self-referral for  "evaluation by medical hematology/oncology.      She has seizure disorder for her entire life. She takes OTC iron and B-complex po daily.     Her mom has chronic anemia issues.     She is a primary patient of Peyton De La Cruz NP    She is followed by Jennifer Thomson NP with [facility] in [place] and they are planning MRI scans in future. She takes her seizure medicine (keppra) only periodically. She has "absent" seizures which usually only last a couple of mins and occurs every couple of months    She has had breast implants since 2016 with Dr Geno Fleming - she does not have any problems with them. 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 order CBC/CMP, iron panel, B12/folate, thyroid panel; hormone panel   2. F/u with PCP  3. Need records from [facility]   4. F/u with [facility] as directed  5. F/u with cardiology     RTC in  6 weeks   Fax note to Jennifer Brunner        I have explained and the patient understands all of  the current recommendation(s). I have answered all of their questions to the best of my ability and to their complete satisfaction.           Thank you for allowing me to participate in this patient's care. Please call with any questions or concerns.    Electronically signed Rg Kim MD         [1]   Social History  Socioeconomic History    Marital status:    Tobacco Use    Smoking status: Never    Smokeless tobacco: Never   Substance and Sexual Activity    Alcohol use: No     Comment: social drinker when not pregnant    Drug use: No   [2] "   Current Outpatient Medications:     atorvastatin (LIPITOR) 20 MG tablet, Take 20 mg by mouth every evening., Disp: , Rfl:     diclofenac (VOLTAREN) 50 MG EC tablet, Take 1 tablet (50 mg total) by mouth 3 (three) times daily as needed., Disp: 20 tablet, Rfl: 2    ibuprofen (ADVIL,MOTRIN) 800 MG tablet, Take 1 tablet (800 mg total) by mouth 3 (three) times daily as needed for Pain. (Patient not taking: Reported on 10/31/2023), Disp: 30 tablet, Rfl: 1    minoxidiL (LONITEN) 2.5 MG tablet, Take 2.5 mg by mouth once daily., Disp: , Rfl:     multivitamin (THERAGRAN) per tablet, Take 1 tablet by mouth once daily., Disp: , Rfl:     ondansetron (ZOFRAN) 4 MG tablet, Take 1 tablet (4 mg total) by mouth every 6 (six) hours as needed for Nausea. (Patient not taking: Reported on 10/31/2023), Disp: 30 tablet, Rfl: 1    sertraline (ZOLOFT) 50 MG tablet, Take 50 mg by mouth once daily., Disp: , Rfl:     SF 5000 PLUS 1.1 % Crea, BRUSH ON NIGHTLY AFTER NORMAL HYGIENE REGIMEN SPIT OUT EXCESS. DO NOT RINSE, Disp: , Rfl: 0    traZODone (DESYREL) 50 MG tablet, Take 50 mg by mouth every evening., Disp: , Rfl:     VYVANSE 50 mg capsule, Take 50 mg by mouth once daily., Disp: , Rfl:

## 2025-03-14 ENCOUNTER — OFFICE VISIT (OUTPATIENT)
Facility: CLINIC | Age: 40
End: 2025-03-14
Payer: MEDICAID

## 2025-03-14 ENCOUNTER — LAB VISIT (OUTPATIENT)
Dept: LAB | Facility: HOSPITAL | Age: 40
End: 2025-03-14
Attending: INTERNAL MEDICINE
Payer: MEDICAID

## 2025-03-14 ENCOUNTER — RESULTS FOLLOW-UP (OUTPATIENT)
Dept: HEMATOLOGY/ONCOLOGY | Facility: HOSPITAL | Age: 40
End: 2025-03-14

## 2025-03-14 VITALS
HEART RATE: 100 BPM | HEIGHT: 64 IN | TEMPERATURE: 97 F | DIASTOLIC BLOOD PRESSURE: 80 MMHG | WEIGHT: 162.5 LBS | RESPIRATION RATE: 18 BRPM | BODY MASS INDEX: 27.74 KG/M2 | SYSTOLIC BLOOD PRESSURE: 127 MMHG

## 2025-03-14 DIAGNOSIS — R53.83 FATIGUE, UNSPECIFIED TYPE: ICD-10-CM

## 2025-03-14 DIAGNOSIS — R53.83 TIRED: ICD-10-CM

## 2025-03-14 DIAGNOSIS — R53.83 FATIGUE, UNSPECIFIED TYPE: Primary | ICD-10-CM

## 2025-03-14 LAB
25(OH)D3+25(OH)D2 SERPL-MCNC: 33 NG/ML (ref 30–96)
ALBUMIN SERPL BCP-MCNC: 4.2 G/DL (ref 3.5–5.2)
ALP SERPL-CCNC: 48 U/L (ref 55–135)
ALT SERPL W/O P-5'-P-CCNC: 10 U/L (ref 10–44)
ANION GAP SERPL CALC-SCNC: 6 MMOL/L (ref 8–16)
AST SERPL-CCNC: 13 U/L (ref 10–40)
BASOPHILS # BLD AUTO: 0.06 K/UL (ref 0–0.2)
BASOPHILS NFR BLD: 0.7 % (ref 0–1.9)
BILIRUB SERPL-MCNC: 0.4 MG/DL (ref 0.1–1)
BUN SERPL-MCNC: 12 MG/DL (ref 6–20)
CALCIUM SERPL-MCNC: 9.2 MG/DL (ref 8.7–10.5)
CHLORIDE SERPL-SCNC: 105 MMOL/L (ref 95–110)
CO2 SERPL-SCNC: 27 MMOL/L (ref 23–29)
CREAT SERPL-MCNC: 0.7 MG/DL (ref 0.5–1.4)
CRP SERPL-MCNC: 0.8 MG/DL
DIFFERENTIAL METHOD BLD: ABNORMAL
EOSINOPHIL # BLD AUTO: 0.1 K/UL (ref 0–0.5)
EOSINOPHIL NFR BLD: 0.9 % (ref 0–8)
ERYTHROCYTE [DISTWIDTH] IN BLOOD BY AUTOMATED COUNT: 13.5 % (ref 11.5–14.5)
ERYTHROCYTE [SEDIMENTATION RATE] IN BLOOD BY WESTERGREN METHOD: 5 MM/HR (ref 0–20)
EST. GFR  (NO RACE VARIABLE): >60 ML/MIN/1.73 M^2
FERRITIN SERPL-MCNC: 54.7 NG/ML (ref 20–300)
FOLATE SERPL-MCNC: >22.3 NG/ML (ref 4–24)
GLUCOSE SERPL-MCNC: 101 MG/DL (ref 70–110)
HCT VFR BLD AUTO: 40.8 % (ref 37–48.5)
HGB BLD-MCNC: 13.4 G/DL (ref 12–16)
IMM GRANULOCYTES # BLD AUTO: 0.06 K/UL (ref 0–0.04)
IMM GRANULOCYTES NFR BLD AUTO: 0.7 % (ref 0–0.5)
IRON SERPL-MCNC: 89 UG/DL (ref 30–160)
LYMPHOCYTES # BLD AUTO: 2.2 K/UL (ref 1–4.8)
LYMPHOCYTES NFR BLD: 23.3 % (ref 18–48)
MCH RBC QN AUTO: 29.8 PG (ref 27–31)
MCHC RBC AUTO-ENTMCNC: 32.8 G/DL (ref 32–36)
MCV RBC AUTO: 91 FL (ref 82–98)
MONOCYTES # BLD AUTO: 0.7 K/UL (ref 0.3–1)
MONOCYTES NFR BLD: 7 % (ref 4–15)
NEUTROPHILS # BLD AUTO: 6.2 K/UL (ref 1.8–7.7)
NEUTROPHILS NFR BLD: 67.4 % (ref 38–73)
NRBC BLD-RTO: 0 /100 WBC
PLATELET # BLD AUTO: 343 K/UL (ref 150–450)
PMV BLD AUTO: 10.3 FL (ref 9.2–12.9)
POTASSIUM SERPL-SCNC: 4.1 MMOL/L (ref 3.5–5.1)
PROT SERPL-MCNC: 7 G/DL (ref 6–8.4)
RBC # BLD AUTO: 4.49 M/UL (ref 4–5.4)
SATURATED IRON: 34 % (ref 20–50)
SODIUM SERPL-SCNC: 138 MMOL/L (ref 136–145)
T4 FREE SERPL-MCNC: 0.78 NG/DL (ref 0.71–1.51)
TOTAL IRON BINDING CAPACITY: 265 UG/DL (ref 250–450)
TRANSFERRIN SERPL-MCNC: 189 MG/DL (ref 200–375)
TSH SERPL DL<=0.005 MIU/L-ACNC: 0.28 UIU/ML (ref 0.34–5.6)
VIT B12 SERPL-MCNC: 357 PG/ML (ref 210–950)
WBC # BLD AUTO: 9.23 K/UL (ref 3.9–12.7)

## 2025-03-14 PROCEDURE — 84443 ASSAY THYROID STIM HORMONE: CPT | Performed by: INTERNAL MEDICINE

## 2025-03-14 PROCEDURE — 83001 ASSAY OF GONADOTROPIN (FSH): CPT | Performed by: INTERNAL MEDICINE

## 2025-03-14 PROCEDURE — 85651 RBC SED RATE NONAUTOMATED: CPT | Performed by: INTERNAL MEDICINE

## 2025-03-14 PROCEDURE — 86140 C-REACTIVE PROTEIN: CPT | Performed by: INTERNAL MEDICINE

## 2025-03-14 PROCEDURE — 83002 ASSAY OF GONADOTROPIN (LH): CPT | Performed by: INTERNAL MEDICINE

## 2025-03-14 PROCEDURE — 84466 ASSAY OF TRANSFERRIN: CPT | Performed by: INTERNAL MEDICINE

## 2025-03-14 PROCEDURE — 82672 ASSAY OF ESTROGEN: CPT | Performed by: INTERNAL MEDICINE

## 2025-03-14 PROCEDURE — 99214 OFFICE O/P EST MOD 30 MIN: CPT | Mod: PBBFAC,PN | Performed by: INTERNAL MEDICINE

## 2025-03-14 PROCEDURE — 99999 PR PBB SHADOW E&M-EST. PATIENT-LVL IV: CPT | Mod: PBBFAC,,, | Performed by: INTERNAL MEDICINE

## 2025-03-14 PROCEDURE — 80053 COMPREHEN METABOLIC PANEL: CPT | Performed by: INTERNAL MEDICINE

## 2025-03-14 PROCEDURE — 82728 ASSAY OF FERRITIN: CPT | Performed by: INTERNAL MEDICINE

## 2025-03-14 PROCEDURE — 82607 VITAMIN B-12: CPT | Performed by: INTERNAL MEDICINE

## 2025-03-14 PROCEDURE — 85025 COMPLETE CBC W/AUTO DIFF WBC: CPT | Performed by: INTERNAL MEDICINE

## 2025-03-14 PROCEDURE — 36415 COLL VENOUS BLD VENIPUNCTURE: CPT | Performed by: INTERNAL MEDICINE

## 2025-03-14 PROCEDURE — 82306 VITAMIN D 25 HYDROXY: CPT | Performed by: INTERNAL MEDICINE

## 2025-03-14 PROCEDURE — 84439 ASSAY OF FREE THYROXINE: CPT | Performed by: INTERNAL MEDICINE

## 2025-03-14 PROCEDURE — 82746 ASSAY OF FOLIC ACID SERUM: CPT | Performed by: INTERNAL MEDICINE

## 2025-03-15 LAB
FSH SERPL-ACNC: 5.5 MIU/ML
LH SERPL-ACNC: 3.8 MIU/ML

## 2025-03-17 ENCOUNTER — TELEPHONE (OUTPATIENT)
Facility: CLINIC | Age: 40
End: 2025-03-17
Payer: MEDICAID

## 2025-03-17 DIAGNOSIS — E53.8 B12 DEFICIENCY: ICD-10-CM

## 2025-03-17 DIAGNOSIS — R79.89 ABNORMAL TSH: ICD-10-CM

## 2025-03-17 DIAGNOSIS — R53.83 FATIGUE, UNSPECIFIED TYPE: Primary | ICD-10-CM

## 2025-03-17 LAB — ESTROGEN SERPL-MCNC: 123 PG/ML

## 2025-03-17 RX ORDER — CYANOCOBALAMIN 1000 UG/ML
1000 INJECTION, SOLUTION INTRAMUSCULAR; SUBCUTANEOUS
Qty: 3 ML | Refills: 3 | Status: SHIPPED | OUTPATIENT
Start: 2025-03-17

## 2025-03-17 RX ORDER — SYRINGE,SAFETY WITH NEEDLE,3ML 25GX5/8"
1 SYRINGE, EMPTY DISPOSABLE MISCELLANEOUS
Qty: 100 EACH | Refills: 0 | Status: SHIPPED | OUTPATIENT
Start: 2025-03-17

## 2025-03-17 NOTE — TELEPHONE ENCOUNTER
"Attempted to call patient to make aware that after review of her recent labs Dr Kim noted that "Her TSH is off - needs to see someone about her thyroid; also start her on b12 injections monthly". No answer, LVM to return my call to discuss.   "

## 2025-03-17 NOTE — TELEPHONE ENCOUNTER
----- Message from Rg Kim MD sent at 3/14/2025  2:54 PM CDT -----  Her TSH is off - needs to see someone about her thyroid; also start her on b12 injections monthly  ----- Message -----  From: [place], [facility] Interface  Sent: 3/14/2025  12:25 PM CDT  To: Rg Kim MD

## 2025-03-27 ENCOUNTER — TELEPHONE (OUTPATIENT)
Facility: CLINIC | Age: 40
End: 2025-03-27
Payer: MEDICAID

## 2025-03-27 NOTE — TELEPHONE ENCOUNTER
----- Message from Vandana sent at 3/24/2025  2:57 PM CDT -----  Pt went to have breast u/s done and was told that she needed DX mammo to be ordered with u/s.Pt also called Dr. Torres and was told that they have not rcvd referral Cb [phone]  ----- Message -----  From: Vandana Rolon  Sent: 3/24/2025   2:59 PM CDT  To: Judy Diez Staff    Pt went to have breast u/s done and was told that she needed DX mammo to be ordered with u/sCb [phone]

## 2025-03-27 NOTE — TELEPHONE ENCOUNTER
Chart reviewed, noted conversation below with scheduling. Message sent to Karlee Alicia making aware patient appt for breast US cancelled and requested that she look into this and let me know of update. Noted that we received denial of referral to Dr Cintron yesterday due to insurance. Message sent to Stephani Wilcox to see if she is able to assist in locating endocrinologist who accepts patient's medicaid insurance. Attempted to call patient with this update, no answer, LVM to return my call to discuss.

## 2025-03-28 ENCOUNTER — TELEPHONE (OUTPATIENT)
Facility: CLINIC | Age: 40
End: 2025-03-28
Payer: MEDICAID

## 2025-03-28 DIAGNOSIS — R53.83 FATIGUE, UNSPECIFIED TYPE: ICD-10-CM

## 2025-03-28 DIAGNOSIS — R79.89 ABNORMAL TSH: ICD-10-CM

## 2025-03-28 DIAGNOSIS — T85.43XA BREAST IMPLANT RUPTURE, INITIAL ENCOUNTER: Primary | ICD-10-CM

## 2025-03-28 NOTE — TELEPHONE ENCOUNTER
Patient made aware that message received from Dr Cintron that they do not accept her insurance and per LEONILA Styles , there are no endocrinologist in [place] that accept her insurance but we can refer her to [facility] or [facility]. She is in agreement with going to [facility], referral placed. I also informed her that I spoke to Yajaira Mike in imaging who made us aware that MRI would better show if she has an implant rupture, so Dr Kim has placed orders for this in place of breast US and scheduling will call to schedule. Verbalized understanding.

## 2025-04-11 ENCOUNTER — TELEPHONE (OUTPATIENT)
Facility: CLINIC | Age: 40
End: 2025-04-11
Payer: MEDICAID

## 2025-04-11 NOTE — TELEPHONE ENCOUNTER
I left voice message for pt regarding confirming appt w/ Dr. Kim, on 4/21/2025. Left number for pt to contact the office, if they have any questions, would like to confirm or would like to reschedule appt

## 2025-04-16 ENCOUNTER — HOSPITAL ENCOUNTER (OUTPATIENT)
Dept: RADIOLOGY | Facility: HOSPITAL | Age: 40
Discharge: HOME | End: 2025-04-16
Attending: INTERNAL MEDICINE
Payer: MEDICAID

## 2025-04-16 ENCOUNTER — RESULTS FOLLOW-UP (OUTPATIENT)
Facility: CLINIC | Age: 40
End: 2025-04-16

## 2025-04-16 ENCOUNTER — TELEPHONE (OUTPATIENT)
Facility: CLINIC | Age: 40
End: 2025-04-16
Payer: MEDICAID

## 2025-04-16 DIAGNOSIS — T85.43XA BREAST IMPLANT RUPTURE, INITIAL ENCOUNTER: ICD-10-CM

## 2025-04-16 PROCEDURE — 77047 MRI BREAST C- BILATERAL: CPT | Mod: 26,,, | Performed by: RADIOLOGY

## 2025-04-16 PROCEDURE — 77047 MRI BREAST C- BILATERAL: CPT | Mod: TC,PO

## 2025-04-16 NOTE — TELEPHONE ENCOUNTER
----- Message from Rg Kim MD sent at 4/16/2025  2:33 PM CDT -----  No implant rupture per radiologist on MRI but looks like they want her to get mammogram   ----- Message -----  From: Interface, Rad Results In  Sent: 4/16/2025  10:46 AM CDT  To: Rg Kim MD

## 2025-04-16 NOTE — TELEPHONE ENCOUNTER
Clarified with JUSTIN Mancini and per her verbal order, patient made aware that the MRI cannot be used as a screening tool for cancer and patient will need an annual mammogram starting in January, 2026. Patient made aware of above and verbalized understanding.

## 2025-04-21 ENCOUNTER — OFFICE VISIT (OUTPATIENT)
Facility: CLINIC | Age: 40
End: 2025-04-21
Payer: MEDICAID

## 2025-04-21 VITALS
WEIGHT: 161.19 LBS | BODY MASS INDEX: 27.52 KG/M2 | HEART RATE: 82 BPM | TEMPERATURE: 98 F | HEIGHT: 64 IN | RESPIRATION RATE: 18 BRPM | OXYGEN SATURATION: 99 %

## 2025-04-21 DIAGNOSIS — E53.8 B12 DEFICIENCY: Primary | ICD-10-CM

## 2025-04-21 DIAGNOSIS — R53.83 FATIGUE, UNSPECIFIED TYPE: ICD-10-CM

## 2025-04-21 PROCEDURE — G2211 COMPLEX E/M VISIT ADD ON: HCPCS | Mod: S$PBB,,, | Performed by: INTERNAL MEDICINE

## 2025-04-21 PROCEDURE — 99214 OFFICE O/P EST MOD 30 MIN: CPT | Mod: S$PBB,,, | Performed by: INTERNAL MEDICINE

## 2025-04-21 PROCEDURE — 99999 PR PBB SHADOW E&M-EST. PATIENT-LVL IV: CPT | Mod: PBBFAC,,, | Performed by: INTERNAL MEDICINE

## 2025-04-21 PROCEDURE — 1159F MED LIST DOCD IN RCRD: CPT | Mod: CPTII,,, | Performed by: INTERNAL MEDICINE

## 2025-04-21 PROCEDURE — 99214 OFFICE O/P EST MOD 30 MIN: CPT | Mod: PBBFAC,PN | Performed by: INTERNAL MEDICINE

## 2025-04-21 PROCEDURE — 1160F RVW MEDS BY RX/DR IN RCRD: CPT | Mod: CPTII,,, | Performed by: INTERNAL MEDICINE

## 2025-04-21 PROCEDURE — 3008F BODY MASS INDEX DOCD: CPT | Mod: CPTII,,, | Performed by: INTERNAL MEDICINE

## 2025-04-21 NOTE — PROGRESS NOTES
"[facility]  PROGRESS NOTE - 2nd Follow-up Visit      Subjective:       Patient ID:   NAME: Lakshmi Rodriguez : 1985     40 y.o. female    Referring Doc: Self, Aaareferral  Other Physicians: Ben; Joe ; Andreea; Peyton De La Cruz NP (PCP); Jennifer Thomson NP with [facility]; Dr Gneo Fleming (plastics)            Chief Complaint: fatigue f/u    History of Present Illness:     Patient returns today for a 2nd regularly scheduled follow-up visit.  The patient is here today to go over the results of the recently ordered labs, tests and studies. She is here by herself    She is feeling about the same with no new issues            ROS:   GEN: normal without any fever, night sweats or weight loss  HEENT: normal with no HA's, sore throat, stiff neck, changes in vision  CV: normal with no CP, SOB, PND, GARCIA or orthopnea  PULM: normal with no SOB, cough, hemoptysis, sputum or pleuritic pain  GI: normal with no abdominal pain, nausea, vomiting, constipation, diarrhea, melanotic stools, BRBPR, or hematemesis  : normal with no hematuria, dysuria  BREAST: normal with no mass, discharge, pain  SKIN: normal with no rash, erythema, bruising, or swelling    Pain Scale: 0    Allergies:  Review of patient's allergies indicates:   Allergen Reactions    Levetiracetam Other (See Comments)    Anesthetics - devon type- parabens Nausea Only       Medications:  Current Medications[1]    PMHx/PSHx Updates:  See patient's last visit with me on 3/14/2025.  See H&P on 3/14/2025        Pathology:   Cancer Staging   No matching staging information was found for the patient.            Objective:     Vitals:  Blood pressure (P) 120/61, pulse 82, temperature 98 °F (36.7 °C), temperature source Temporal, resp. rate 18, height 5' 4" (1.626 m), weight 73.1 kg (161 lb 3.2 oz), SpO2 99%.    Physical Examination:   GEN: no apparent distress, comfortable; AAOx3  HEAD: atraumatic and normocephalic  EYES: no pallor, no " icterus, PERRLA  ENT: OMM, no pharyngeal erythema, external ears WNL; no nasal discharge; no thrush  NECK: no masses, thyroid normal, trachea midline, no LAD/LN's, supple  CV: RRR with no murmur; normal pulse; normal S1 and S2; no pedal edema  CHEST: Normal respiratory effort; CTAB; normal breath sounds; no wheeze or crackles  ABDOM: nontender and nondistended; soft; normal bowel sounds; no rebound/guarding  MUSC/Skeletal: ROM normal; no crepitus; joints normal; no deformities or arthropathy  EXTREM: no clubbing, cyanosis, inflammation or swelling  SKIN: no rashes, lesions, ulcers, petechiae or subcutaneous nodules; tattoos   : no rodriguez  NEURO: grossly intact; motor/sensory WNL; AAOx3; no tremors  PSYCH: normal mood, affect and behavior  LYMPH: normal cervical, supraclavicular, axillary and groin LN's            Labs:     Lab Results   Component Value Date    WBC 9.23 03/14/2025    HGB 13.4 03/14/2025    HCT 40.8 03/14/2025    MCV 91 03/14/2025     03/14/2025       CMP  Sodium   Date Value Ref Range Status   03/14/2025 138 136 - 145 mmol/L Final     Potassium   Date Value Ref Range Status   03/14/2025 4.1 3.5 - 5.1 mmol/L Final     Chloride   Date Value Ref Range Status   03/14/2025 105 95 - 110 mmol/L Final     CO2   Date Value Ref Range Status   03/14/2025 27 23 - 29 mmol/L Final     Glucose   Date Value Ref Range Status   03/14/2025 101 70 - 110 mg/dL Final     BUN   Date Value Ref Range Status   03/14/2025 12 6 - 20 mg/dL Final     Creatinine   Date Value Ref Range Status   03/14/2025 0.7 0.5 - 1.4 mg/dL Final   03/14/2012 0.4 0.2 - 1.4 mg/dl Final     Calcium   Date Value Ref Range Status   03/14/2025 9.2 8.7 - 10.5 mg/dL Final   03/14/2012 8.7 8.6 - 10.2 mg/dl Final     Total Protein   Date Value Ref Range Status   03/14/2025 7.0 6.0 - 8.4 g/dL Final     Albumin   Date Value Ref Range Status   03/14/2025 4.2 3.5 - 5.2 g/dL Final     Total Bilirubin   Date Value Ref Range Status   03/14/2025 0.4 0.1 -  "1.0 mg/dL Final     Comment:     For infants and newborns, interpretation of results should be based  on gestational age, weight and in agreement with clinical  observations.    Premature Infant recommended reference ranges:  Up to 24 hours.............<8.0 mg/dL  Up to 48 hours............<12.0 mg/dL  3-5 days..................<15.0 mg/dL  6-29 days.................<15.0 mg/dL       Alkaline Phosphatase   Date Value Ref Range Status   03/14/2025 48 (L) 55 - 135 U/L Final   03/14/2012 117 23 - 119 UNIT/L Final     AST   Date Value Ref Range Status   03/14/2025 13 10 - 40 U/L Final   03/14/2012 44 (H) 10 - 30 UNIT/L Final     ALT   Date Value Ref Range Status   03/14/2025 10 10 - 44 U/L Final     Anion Gap   Date Value Ref Range Status   03/14/2025 6 (L) 8 - 16 mmol/L Final   03/14/2012 9 5 - 15 meq/L Final     eGFR   Date Value Ref Range Status   03/14/2025 >60.0 >60 mL/min/1.73 m^2 Final     Lab Results   Component Value Date    IRON 89 03/14/2025    TRANSFERRIN 189 (L) 03/14/2025    TIBC 265 03/14/2025    FESATURATED 34 03/14/2025      Lab Results   Component Value Date    FERRITIN 54.7 03/14/2025     Lab Results   Component Value Date    ZDMUQNAA50 357 03/14/2025     Lab Results   Component Value Date    FOLATE >22.3 03/14/2025           Radiology/Diagnostic Studies:        I have reviewed all available lab results and radiology reports.    Assessment/Plan:   (1) 40 y.o. female with diagnosis of fatigue who is a self-referral for evaluation by medical hematology/oncology.       She has seizure disorder for her entire life. She takes OTC iron and B-complex po daily.      Her mom has chronic anemia issues.      She is a primary patient of Peyton De La Cruz NP     She is followed by Jennifer Thomson NP with [facility] in [place] and they are planning MRI scans in future. She takes her seizure medicine (keppra) only periodically. She has "absent" seizures which usually only last a couple of mins and occurs every couple " of months     She has had breast implants since 2016 with Dr Geno Fleming - she does not have any problems with them. 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 Continue b12 injections monthly; repeat levels in 6 months    2. F/u with PCP about her thyroid   3. F/u with [facility] as directed  4. F/u with cardiology     RTC in  6 weeks   Fax note to Peyton De La Cruz, Jennifer Thomson; Ben ,     Discussion:       I reviewed results of the recently ordered labs, tests and studies; made directives with regards to the results. I have explained all of the above in detail and the patient understands all of the current recommendation(s). I have answered all of their questions to the best of my ability and to their complete satisfaction. The patient is to continue with the current management plan.            Electronically signed by Rg Kim MD               [1]   Current Outpatient Medications:     atorvastatin (LIPITOR) 20 MG tablet, Take 20 mg by mouth every evening., Disp: , Rfl:     cyanocobalamin 1,000 mcg/mL injection, Inject 1 mL (1,000 mcg total) into the skin every 30 days., Disp: 3 mL, Rfl: 3    diclofenac (VOLTAREN) 50 MG EC tablet, Take 1 tablet (50 mg total) by mouth 3 (three) times daily as needed., Disp: 20 tablet, Rfl: 2    minoxidiL (LONITEN) 2.5 MG tablet, Take 2.5 mg by mouth once daily., Disp: , Rfl:     multivitamin  "(THERAGRAN) per tablet, Take 1 tablet by mouth once daily., Disp: , Rfl:     sertraline (ZOLOFT) 50 MG tablet, Take 50 mg by mouth once daily., Disp: , Rfl:     SF 5000 PLUS 1.1 % Crea, BRUSH ON NIGHTLY AFTER NORMAL HYGIENE REGIMEN SPIT OUT EXCESS. DO NOT RINSE, Disp: , Rfl: 0    syringe with needle, safety (MONOJECT SAFETY SYRINGES) 3 mL 25 gauge x 5/8" Syrg, 1 Syringe by Misc.(Non-Drug; Combo Route) route every 30 days., Disp: 100 each, Rfl: 0    traZODone (DESYREL) 50 MG tablet, Take 50 mg by mouth every evening., Disp: , Rfl:     VYVANSE 50 mg capsule, Take 50 mg by mouth once daily., Disp: , Rfl:     ibuprofen (ADVIL,MOTRIN) 800 MG tablet, Take 1 tablet (800 mg total) by mouth 3 (three) times daily as needed for Pain. (Patient not taking: Reported on 10/31/2023), Disp: 30 tablet, Rfl: 1    ondansetron (ZOFRAN) 4 MG tablet, Take 1 tablet (4 mg total) by mouth every 6 (six) hours as needed for Nausea. (Patient not taking: Reported on 10/31/2023), Disp: 30 tablet, Rfl: 1    "

## 2025-04-25 ENCOUNTER — TELEPHONE (OUTPATIENT)
Facility: CLINIC | Age: 40
End: 2025-04-25
Payer: MEDICAID

## 2025-04-25 DIAGNOSIS — Z12.31 SCREENING MAMMOGRAM FOR BREAST CANCER: Primary | ICD-10-CM

## 2025-04-25 NOTE — TELEPHONE ENCOUNTER
Per Eli Rios, patient needs updated dx code for screening mammo. Unable to addend already scheduled mammo, so placed new order with updated codes. Eli made aware.

## 2025-05-01 ENCOUNTER — HOSPITAL ENCOUNTER (OUTPATIENT)
Dept: RADIOLOGY | Facility: HOSPITAL | Age: 40
Discharge: HOME | End: 2025-05-01
Attending: INTERNAL MEDICINE
Payer: MEDICAID

## 2025-05-01 VITALS — BODY MASS INDEX: 27.49 KG/M2 | WEIGHT: 161 LBS | HEIGHT: 64 IN

## 2025-05-01 DIAGNOSIS — Z12.31 SCREENING MAMMOGRAM FOR BREAST CANCER: ICD-10-CM

## 2025-05-01 PROCEDURE — 77067 SCR MAMMO BI INCL CAD: CPT | Mod: 26,,, | Performed by: RADIOLOGY

## 2025-05-01 PROCEDURE — 77063 BREAST TOMOSYNTHESIS BI: CPT | Mod: 26,,, | Performed by: RADIOLOGY

## 2025-05-01 PROCEDURE — 77067 SCR MAMMO BI INCL CAD: CPT | Mod: TC,PO

## 2025-05-04 NOTE — PROGRESS NOTES
ENDOCRINOLOGY NEW PATIENT VISIT: 05/05/2025    The patient location is: Home  The chief complaint leading to consultation is: Abnormal Thyroid Labs    Visit type: audiovisual    Face to Face time with patient: 25 minutes  44 minutes of total time spent on the encounter, which includes face to face time and non-face to face time preparing to see the patient (eg, review of tests), Obtaining and/or reviewing separately obtained history, Documenting clinical information in the electronic or other health record, Independently interpreting results (not separately reported) and communicating results to the patient/family/caregiver, or Care coordination (not separately reported).     Each patient to whom he or she provides medical services by telemedicine is:  (1) informed of the relationship between the physician and patient and the respective role of any other health care provider with respect to management of the patient; and (2) notified that he or she may decline to receive medical services by telemedicine and may withdraw from such care at any time.    Subjective:      Patient ID: Lakshmi Rodriguez is a 40 y.o. female.    Chief Complaint:  Consult and Thyroid Nodule (Abnormal tsh level)    History of Present Illness      Lakshmi Rodriguez presents today for evaluation of abnormal thyroid function testing associated with non-specific fatigue. She reports chronic fatigue since childhood. She experiences heat intolerance with excessive sweating during mild activities, hand tremors primarily in the morning, palpitations, and constant thirst. She describes constant anxiety and nervousness. Her hair is dry and falling out with difficulty growing it long. She has a history of absence seizures, characterized by brief periods of unresponsiveness and staring with associated hand tremors during episodes. She also has a history of intermittent low sodium levels, with the most recent low level in October 2023. She has a family history  of hypothyroidism in her mother, grandmother, and aunt. There is also a maternal family history of diabetes with unknown age of onset. She was previously on Lamotrigine 150 mg for seizures. She currently takes biotin supplement 5000 mcg at night. She previously tried minoxidil for hair loss. She lives with her  and two children. She has a past history of social smoking as a teenager.          Regarding Hyperthyroidism:    - Duration of hyperthyroidism:  Abnormal labs first noted in March 2025  - Etiology determined to be: Unclear, possible transient change versus autoimmune cause  - Current relevant medications: None    Lab Results   Component Value Date    TSH 0.281 (L) 03/14/2025    FREET4 0.78 03/14/2025     - Most recent thyroid imaging:  None    - Most recent DEXA: None    - Current symptoms:   No   Yes  []    [x]   Heat intolerance/sweating  []    [x]   Palpitations (possible caffeine related)  [x]    []   Shortness of breath  [x]    []   Neck swelling/pain/pressure or hoarseness  [x]    []   Weight loss  []    [x]   Tremor (in her hands)  []    [x]   Anxiety/Nervousness  []    [x]   Fatigue  [x]    []   Diarrhea  [x]    []   Constipation  [x]    []   Eye bulging/redness/pain/swelling  [x]    []   Muscle weakness  []    [x]   Hair thinning  [x]    []   Pretibial or localized myxedema    - Pertinent factors:  No   Yes  [x]    []   Tobacco Use  [x]    []   Exogenous thyroid medication  [x]    []   Recent iodinated contrast (in the last 3-6 months or shortly before initial abnormal TFTs)  []    [x]   Biotin use (taken day before or morning of abnormal labs) (patients taking biotin should be held for 3-5 days prior to labs)  [x]    []   Amiodarone or Lithium use (if so, when were the dates of use?)  [x]    []   Chemotherapy (if so, when were dates of use and last treatment?)  [x]    []   Prior neck radiation (if so, when?)  [x]    []   Severe illness in past 3-6 months (subacute thyroiditis)    - Personal  "or Family History:   No   Yes  []    [x]   Thyroid Disorder (mother, grandmother and aunt with hypothyroidism)  [x]    []   Thyroid Cancer     ROS:   As above    Objective:     There were no vitals taken for this visit.  BP Readings from Last 3 Encounters:   04/21/25 (P) 120/61   03/14/25 127/80   10/31/23 114/70     Wt Readings from Last 1 Encounters:   05/01/25 1043 73 kg (161 lb)     There is no height or weight on file to calculate BMI.      Physical Exam    General: No acute distress. Nontoxic appearing.  Psychiatric: Normal mood. Normal affect. No evidence of SI.           Lab Review:   No results found for: "HGBA1C"  No results found for: "CHOL", "HDL", "LDLCALC", "TRIG", "CHOLHDL"  Lab Results   Component Value Date     03/14/2025    K 4.1 03/14/2025     03/14/2025    CO2 27 03/14/2025     03/14/2025    BUN 12 03/14/2025    CREATININE 0.7 03/14/2025    CALCIUM 9.2 03/14/2025    PROT 7.0 03/14/2025    ALBUMIN 4.2 03/14/2025    BILITOT 0.4 03/14/2025    ALKPHOS 48 (L) 03/14/2025    AST 13 03/14/2025    ALT 10 03/14/2025    ANIONGAP 6 (L) 03/14/2025    ESTGFRAFRICA >60.0 01/02/2022    EGFRNONAA >60.0 01/02/2022    TSH 0.281 (L) 03/14/2025     Vit D, 25-Hydroxy   Date Value Ref Range Status   03/14/2025 33 30 - 96 ng/mL Final     Comment:     Vitamin D deficiency.........<10 ng/mL                              Vitamin D insufficiency......10-29 ng/mL       Vitamin D sufficiency........> or equal to 30 ng/mL  Vitamin D toxicity............>100 ng/mL         Assessment and Plan     Assessment & Plan    R79.89 Abnormal TSH  R53.83 Fatigue, unspecified type    IMPRESSION:  Patient presents with fatigue and abnormal thyroid function tests from March 2025 showing low TSH (0.281) and low-normal free T4 (0.78).  Family history of hypothyroidism raises suspicion for Hashimoto's thyroiditis.  Some symptoms (anxiety, tremors, heat intolerance) could indicate hyperthyroidism.  Current biotin " supplementation may be interfering with thyroid lab results.  If labs confirm hyperthyroidism without positive TRAb, consider thyroid imaging to evaluate for toxic nodule.  Will determine need for thyroid hormone replacement or anti-thyroid medication based on updated lab results.    ABNORMAL TSH:  - Explained the relationship between TSH and thyroid hormone levels in hypo- and hyperthyroidism, including autoimmune thyroid conditions (Hashimoto's and Graves' disease) and their presentations.  - Discussed potential interference of biotin supplementation with thyroid function tests.  - Patient instructed to hold biotin-containing hair, skin, and nail vitamin for 3 days prior to thyroid labs.  - Ordered comprehensive thyroid panel including TSH, Free T4, Total T3, Thyroid peroxidase antibody (TPO), and Thyrotropin receptor antibody (TRAb) after biotin washout.  - Patient to have labs drawn Thursday or Friday this week in [place] with follow-up afterward.  - Contact office with any questions before then.        FATIGUE:   - As noted we will be testing for thyroid function labs as contributor to fatigue  - If normal thyroid function testing then possibly TSH being low was a transient change and further workup for fatigue can continue with PCP or hematology at that time.    RTC in 1 year.  Labs this Thursday.      **Visit today included increased complexity associated with the care of the problems addressed and managing the longitudinal care of the patient due to the serious and/or complex managed problems      Canelo Bacon,      Disclaimer: This note has been generated using voice-recognition software. There may be typographical errors that have been missed during proof-reading.    This note was generated with the assistance of ambient listening technology. Verbal consent was obtained by the patient and accompanying visitor(s) for the recording of patient appointment to facilitate this note. I attest to having  reviewed and edited the generated note for accuracy, though some syntax or spelling errors may persist. Please contact the author of this note for any clarification.

## 2025-05-05 ENCOUNTER — OFFICE VISIT (OUTPATIENT)
Facility: CLINIC | Age: 40
End: 2025-05-05
Payer: MEDICAID

## 2025-05-05 DIAGNOSIS — R79.89 ABNORMAL TSH: ICD-10-CM

## 2025-05-05 DIAGNOSIS — R53.83 FATIGUE, UNSPECIFIED TYPE: ICD-10-CM

## 2025-05-05 PROCEDURE — 1159F MED LIST DOCD IN RCRD: CPT | Mod: CPTII,95,, | Performed by: STUDENT IN AN ORGANIZED HEALTH CARE EDUCATION/TRAINING PROGRAM

## 2025-05-05 PROCEDURE — G2211 COMPLEX E/M VISIT ADD ON: HCPCS | Mod: 95,,, | Performed by: STUDENT IN AN ORGANIZED HEALTH CARE EDUCATION/TRAINING PROGRAM

## 2025-05-05 PROCEDURE — 98002 SYNCH AUDIO-VIDEO NEW MOD 45: CPT | Mod: 95,,, | Performed by: STUDENT IN AN ORGANIZED HEALTH CARE EDUCATION/TRAINING PROGRAM

## 2025-05-05 NOTE — PATIENT INSTRUCTIONS
Visit Summary    We reviewed your recent thyroid lab results which showed a slightly low TSH and low-normal free T4. Given your symptoms (fatigue, anxiety, heat intolerance) and a family history of thyroid disease, were checking for possible thyroid dysfunction, including both underactive (Hashimotos) and overactive (Graves) thyroid conditions.    Important: Please stop taking any biotin-containing supplements (such as hair, skin, and nail vitamins) for at least 3 days before your thyroid blood work, as biotin can interfere with test accuracy.    Your lab order includes TSH, Free T4, Total T3, TPO antibody, and TRAb. Please have these drawn this Thursday or Friday in [place] as discussed.    Well determine next steps after reviewing these results. If everything is normal, the earlier TSH change may have been temporary, and you can continue fatigue evaluation with your PCP or hematology if needed.    Feel free to message our office with any questions.

## 2025-05-19 ENCOUNTER — TELEPHONE (OUTPATIENT)
Facility: CLINIC | Age: 40
End: 2025-05-19
Payer: MEDICAID

## 2025-05-19 DIAGNOSIS — E53.8 B12 DEFICIENCY: Primary | ICD-10-CM

## 2025-05-19 DIAGNOSIS — Z76.89 ENCOUNTER TO ESTABLISH CARE: Primary | ICD-10-CM

## 2025-05-19 RX ORDER — LISDEXAMFETAMINE DIMESYLATE 50 MG/1
50 CAPSULE ORAL DAILY
Qty: 30 CAPSULE | Refills: 0 | Status: SHIPPED | OUTPATIENT
Start: 2025-05-19

## 2025-05-19 RX ORDER — SERTRALINE HYDROCHLORIDE 50 MG/1
50 TABLET, FILM COATED ORAL DAILY
Qty: 30 TABLET | Refills: 0 | Status: SHIPPED | OUTPATIENT
Start: 2025-05-19

## 2025-05-19 NOTE — TELEPHONE ENCOUNTER
----- Message from Daysi Castellanos NP sent at 5/19/2025 10:57 AM CDT -----  We can give 30 day supply  ----- Message -----  From: Sailaja Ingram, RN  Sent: 5/19/2025  10:41 AM CDT  To: Daysi Christensen NP    I placed referral to serina frias NP but patient said she is almost out of vivance and zoloft and wanted to know if we could fill as her previous PCP closed with no warning  ----- Message -----  From: Suni Cruz  Sent: 5/19/2025   9:00 AM CDT  To: Judy Diez Staff    The patient called to request a referral to a PCP. She said she was seeing Dr. Darleen Cordon and they have closed the practice and she needs to see someone soon because she is on antidepressants and anxiety medication and will need refills soon. Please call her back at [phone]. She would like someone in the [place] area.

## 2025-05-19 NOTE — TELEPHONE ENCOUNTER
Patient made aware 30 day supply of meds will be sent to pharmacy per Daysi's ok to do so while she establishes care with new PCP. Verbalized understanding.

## 2025-05-19 NOTE — TELEPHONE ENCOUNTER
----- Message from Daysi Castellanos NP sent at 5/19/2025 10:57 AM CDT -----  We can give 30 day supply  ----- Message -----  From: Sailaja Ingram, RN  Sent: 5/19/2025  10:41 AM CDT  To: Daysi Christensen NP    I placed referral to serina frias NP but patient said she is almost out of vivance and zoloft and wanted to know if we could fill as her previous PCP closed with no warning  ----- Message -----  From: Suni Cruz  Sent: 5/19/2025   9:00 AM CDT  To: Judy Diez Staff    The patient called to request a referral to a PCP. She said she was seeing Dr. Darleen Cordon and they have closed the practice and she needs to see someone soon because she is on antidepressants and anxiety medication and will need refills soon. Please call her back at [phone]. She would like someone in the [place] area.

## 2025-05-22 ENCOUNTER — TELEPHONE (OUTPATIENT)
Facility: CLINIC | Age: 40
End: 2025-05-22
Payer: MEDICAID

## 2025-05-22 NOTE — TELEPHONE ENCOUNTER
Patient aware that this office is not able to order this medication. Referral to PCP previously sent for patient to establish care as previous PCP went out of business with no notice.

## 2025-05-22 NOTE — TELEPHONE ENCOUNTER
----- Message from Daysi Castellanos NP sent at 5/22/2025  2:58 PM CDT -----  So she will have to establish with someone else.  ----- Message -----  From: Sailaja Ingram RN  Sent: 5/22/2025  12:20 PM CDT  To: Daysi McConeSiobhan Castellanos NP    Patient made aware, she said she was previously prescribed this for binge eating and anxiety. Not sure if this makes any difference but wanted to make you aware.  ----- Message -----  From: Daysi Castellanos NP  Sent: 5/22/2025  11:38 AM CDT  To: Sailaja Ingram RN    I can't fill actually because her ADHD is not in the chart, and I can't add it  ----- Message -----  From: Sailaja Ingram RN  Sent: 5/21/2025  11:06 AM CDT  To: Daysi [place] IRIS Castellanos    You were filling this as a courtesy due to her PCP closing without notice. She doesn't have a dx code on chart for this, anything I can add that would be appropriate?  ----- Message -----  From: Suni Cruz  Sent: 5/21/2025   9:54 AM CDT  To: Judy Diez Staff    The patient said that [facility] told her they are unable fill her vyvanse prescription because they need a diagnostic code for the insurance. Please send to [facility] on . # [phone]

## 2025-05-22 NOTE — TELEPHONE ENCOUNTER
----- Message from Daysi Castellanos NP sent at 5/22/2025 11:37 AM CDT -----  I can't fill actually because her ADHD is not in the chart, and I can't add it  ----- Message -----  From: Sailaja Ingram, RN  Sent: 5/21/2025  11:06 AM CDT  To: Daysi Christensen NP    You were filling this as a courtesy due to her PCP closing without notice. She doesn't have a dx code on chart for this, anything I can add that would be appropriate?  ----- Message -----  From: Suni Cruz  Sent: 5/21/2025   9:54 AM CDT  To: Judy Diez Staff    The patient said that [facility] told her they are unable fill her vyvanse prescription because they need a diagnostic code for the insurance. Please send to [facility] on . # [phone]

## 2025-05-22 NOTE — TELEPHONE ENCOUNTER
Spoke to patient made aware that per Daysi we are not able to fill this medication as we are not able to add ADHD dx to that script as this is not on her chart and we are not able to add this dx to her chart. Patient states she was prescribed this med for binge eating/anxiety and inquired as to what dx was on previous script which I informed her I am not able to see. She asked if we can add a dx that is already on her chart which I told her we have already done but these are not acceptable codes for this particular med. Verbalized understanding.

## 2025-07-02 ENCOUNTER — TELEPHONE (OUTPATIENT)
Facility: CLINIC | Age: 40
End: 2025-07-02
Payer: MEDICAID

## 2025-07-02 NOTE — TELEPHONE ENCOUNTER
Reviewed with Daysi who was also made aware that we previously sent referral to PCP back in may for patient to est care so that they could take over filling these meds as patient reported that her usual PCP is no longer practicing. Per Daysi we are not able to refill these meds and she will need to go to an urgent care to request refill of this medication and will also need to est care with PCP. Spoke to patient made aware of above, provided her with IRIS Kelsey's office number so that she can call to schedule new patient appt. Verbalized understanding.

## 2025-07-02 NOTE — TELEPHONE ENCOUNTER
----- Message from Lorene sent at 7/1/2025  2:25 PM CDT -----  Regarding: refills  VYVANSE 50 mg capsule 30 capsule     traZODone (DESYREL) 50 MG tablet    sertraline (ZOLOFT) 50 MG tablet 30 tablet    Pt requesting refills    Contact Information  [phone]

## 2025-07-21 ENCOUNTER — LAB VISIT (OUTPATIENT)
Dept: LAB | Facility: HOSPITAL | Age: 40
End: 2025-07-21
Attending: INTERNAL MEDICINE
Payer: MEDICAID

## 2025-07-21 DIAGNOSIS — R53.83 FATIGUE, UNSPECIFIED TYPE: ICD-10-CM

## 2025-07-21 DIAGNOSIS — E53.8 B12 DEFICIENCY: ICD-10-CM

## 2025-07-21 LAB
ABSOLUTE EOSINOPHIL (SMH): 0.14 K/UL
ABSOLUTE MONOCYTE (SMH): 0.68 K/UL (ref 0.3–1)
ABSOLUTE NEUTROPHIL COUNT (SMH): 6.1 K/UL (ref 1.8–7.7)
ALBUMIN SERPL-MCNC: 4.5 G/DL (ref 3.5–5.2)
ALP SERPL-CCNC: 42 UNIT/L (ref 55–135)
ALT SERPL-CCNC: 10 UNIT/L (ref 10–44)
ANION GAP (SMH): 7 MMOL/L (ref 8–16)
AST SERPL-CCNC: 14 UNIT/L (ref 10–40)
BASOPHILS # BLD AUTO: 0.07 K/UL
BASOPHILS NFR BLD AUTO: 0.7 %
BILIRUB SERPL-MCNC: 0.4 MG/DL (ref 0.1–1)
BUN SERPL-MCNC: 14 MG/DL (ref 6–20)
CALCIUM SERPL-MCNC: 9.3 MG/DL (ref 8.7–10.5)
CHLORIDE SERPL-SCNC: 107 MMOL/L (ref 95–110)
CO2 SERPL-SCNC: 25 MMOL/L (ref 23–29)
CREAT SERPL-MCNC: 0.8 MG/DL (ref 0.5–1.4)
ERYTHROCYTE [DISTWIDTH] IN BLOOD BY AUTOMATED COUNT: 12.8 % (ref 11.5–14.5)
FERRITIN SERPL-MCNC: 55.3 NG/ML (ref 20–300)
FOLATE SERPL-MCNC: >22.3 NG/ML (ref 4–24)
GFR SERPLBLD CREATININE-BSD FMLA CKD-EPI: >60 ML/MIN/1.73/M2
GLUCOSE SERPL-MCNC: 131 MG/DL (ref 70–110)
HCT VFR BLD AUTO: 40.9 % (ref 37–48.5)
HGB BLD-MCNC: 13.9 GM/DL (ref 12–16)
IMM GRANULOCYTES # BLD AUTO: 0.04 K/UL (ref 0–0.04)
IMM GRANULOCYTES NFR BLD AUTO: 0.4 % (ref 0–0.5)
IRON SATN MFR SERPL: 19 % (ref 20–50)
IRON SERPL-MCNC: 53 UG/DL (ref 30–160)
LYMPHOCYTES # BLD AUTO: 2.5 K/UL (ref 1–4.8)
MCH RBC QN AUTO: 30.3 PG (ref 27–31)
MCHC RBC AUTO-ENTMCNC: 34 G/DL (ref 32–36)
MCV RBC AUTO: 89 FL (ref 82–98)
NUCLEATED RBC (/100WBC) (SMH): 0 /100 WBC
PLATELET # BLD AUTO: 301 K/UL (ref 150–450)
PMV BLD AUTO: 10.3 FL (ref 9.2–12.9)
POTASSIUM SERPL-SCNC: 3.9 MMOL/L (ref 3.5–5.1)
PROT SERPL-MCNC: 7 GM/DL (ref 6–8.4)
RBC # BLD AUTO: 4.58 M/UL (ref 4–5.4)
RELATIVE EOSINOPHIL (SMH): 1.5 % (ref 0–8)
RELATIVE LYMPHOCYTE (SMH): 26.4 % (ref 18–48)
RELATIVE MONOCYTE (SMH): 7.2 % (ref 4–15)
RELATIVE NEUTROPHIL (SMH): 63.8 % (ref 38–73)
SODIUM SERPL-SCNC: 139 MMOL/L (ref 136–145)
TIBC SERPL-MCNC: 281 UG/DL (ref 250–450)
TRANSFERRIN SERPL-MCNC: 201 MG/DL (ref 200–375)
VIT B12 SERPL-MCNC: 363 PG/ML (ref 210–950)
WBC # BLD AUTO: 9.48 K/UL (ref 3.9–12.7)

## 2025-07-21 PROCEDURE — 84075 ASSAY ALKALINE PHOSPHATASE: CPT

## 2025-07-21 PROCEDURE — 82746 ASSAY OF FOLIC ACID SERUM: CPT

## 2025-07-21 PROCEDURE — 82607 VITAMIN B-12: CPT

## 2025-07-21 PROCEDURE — 36415 COLL VENOUS BLD VENIPUNCTURE: CPT

## 2025-07-21 PROCEDURE — 83540 ASSAY OF IRON: CPT

## 2025-07-21 PROCEDURE — 85025 COMPLETE CBC W/AUTO DIFF WBC: CPT

## 2025-07-21 PROCEDURE — 82728 ASSAY OF FERRITIN: CPT

## 2025-07-24 ENCOUNTER — OFFICE VISIT (OUTPATIENT)
Facility: CLINIC | Age: 40
End: 2025-07-24
Payer: MEDICAID

## 2025-07-24 VITALS
HEIGHT: 64 IN | DIASTOLIC BLOOD PRESSURE: 77 MMHG | SYSTOLIC BLOOD PRESSURE: 120 MMHG | RESPIRATION RATE: 16 BRPM | HEART RATE: 76 BPM | BODY MASS INDEX: 26.8 KG/M2 | OXYGEN SATURATION: 96 % | WEIGHT: 157 LBS | TEMPERATURE: 99 F

## 2025-07-24 DIAGNOSIS — E53.8 B12 DEFICIENCY: Primary | ICD-10-CM

## 2025-07-24 PROCEDURE — 3074F SYST BP LT 130 MM HG: CPT | Mod: CPTII,,, | Performed by: INTERNAL MEDICINE

## 2025-07-24 PROCEDURE — 99214 OFFICE O/P EST MOD 30 MIN: CPT | Mod: S$PBB,,, | Performed by: INTERNAL MEDICINE

## 2025-07-24 PROCEDURE — 1160F RVW MEDS BY RX/DR IN RCRD: CPT | Mod: CPTII,,, | Performed by: INTERNAL MEDICINE

## 2025-07-24 PROCEDURE — 99213 OFFICE O/P EST LOW 20 MIN: CPT | Mod: PBBFAC,PN | Performed by: INTERNAL MEDICINE

## 2025-07-24 PROCEDURE — 99999 PR PBB SHADOW E&M-EST. PATIENT-LVL III: CPT | Mod: PBBFAC,,, | Performed by: INTERNAL MEDICINE

## 2025-07-24 PROCEDURE — 1159F MED LIST DOCD IN RCRD: CPT | Mod: CPTII,,, | Performed by: INTERNAL MEDICINE

## 2025-07-24 PROCEDURE — 3008F BODY MASS INDEX DOCD: CPT | Mod: CPTII,,, | Performed by: INTERNAL MEDICINE

## 2025-07-24 PROCEDURE — 3078F DIAST BP <80 MM HG: CPT | Mod: CPTII,,, | Performed by: INTERNAL MEDICINE

## 2025-07-24 PROCEDURE — G2211 COMPLEX E/M VISIT ADD ON: HCPCS | Mod: ,,, | Performed by: INTERNAL MEDICINE

## 2025-07-24 NOTE — PROGRESS NOTES
"[facility]  PROGRESS NOTE -  Follow-up Visit      Subjective:       Patient ID:   NAME: Lakshmi Rodriguez : 1985     40 y.o. female    Referring Doc: Self, Aaareferral  Other Physicians: Ben; Joe ; Andreea; Peyton De La Cruz NP (PCP); Jennifer Thomson NP with [facility]; Dr Geno Fleming (plastics)            Chief Complaint: fatigue f/u    History of Present Illness:     Patient returns today for a regularly scheduled follow-up visit.  The patient is here today to go over the results of the recently ordered labs, tests and studies. She is here by herself    She is feeling about the same with no new issues; chronic fatigue    She had a telemed visit with Dr Canelo Bacon with endocrinology in May 2025    She has not established with PCP as of yet since Darleen Cordon NP stopped practicing.             ROS:   GEN: chronic fatigue; general lack of energy; she thinks she has ADHD  HEENT: normal with no HA's, sore throat, stiff neck, changes in vision  CV: normal with no CP, SOB, PND, GARCIA or orthopnea  PULM: normal with no SOB, cough, hemoptysis, sputum or pleuritic pain  GI: normal with no abdominal pain, nausea, vomiting, constipation, diarrhea, melanotic stools, BRBPR, or hematemesis  : normal with no hematuria, dysuria  BREAST: normal with no mass, discharge, pain  SKIN: normal with no rash, erythema, bruising, or swelling    Pain Scale: 0    Allergies:  Review of patient's allergies indicates:   Allergen Reactions    Levetiracetam Other (See Comments)    Anesthetics - devon type- parabens Nausea Only       Medications:  Current Medications[1]    PMHx/PSHx Updates:  See patient's last visit with me on 2025.  See H&P on 3/14/2025        Pathology:   Cancer Staging   No matching staging information was found for the patient.            Objective:     Vitals:  Blood pressure 120/77, pulse 76, temperature 98.6 °F (37 °C), temperature source Temporal, resp. rate 16, height 5' 4" " (1.626 m), weight 71.2 kg (157 lb), SpO2 96%.    Physical Examination:   GEN: no apparent distress, comfortable; AAOx3  HEAD: atraumatic and normocephalic  EYES: no pallor, no icterus, PERRLA  ENT: OMM, no pharyngeal erythema, external ears WNL; no nasal discharge; no thrush  NECK: no masses, thyroid normal, trachea midline, no LAD/LN's, supple  CV: RRR with no murmur; normal pulse; normal S1 and S2; no pedal edema  CHEST: Normal respiratory effort; CTAB; normal breath sounds; no wheeze or crackles  ABDOM: nontender and nondistended; soft; normal bowel sounds; no rebound/guarding  MUSC/Skeletal: ROM normal; no crepitus; joints normal; no deformities or arthropathy  EXTREM: no clubbing, cyanosis, inflammation or swelling  SKIN: no rashes, lesions, ulcers, petechiae or subcutaneous nodules; tattoos   : no rodriguez  NEURO: grossly intact; motor/sensory WNL; AAOx3; no tremors  PSYCH: normal mood, affect and behavior  LYMPH: normal cervical, supraclavicular, axillary and groin LN's            Labs:     Lab Results   Component Value Date    WBC 9.48 07/21/2025    HGB 13.9 07/21/2025    HCT 40.9 07/21/2025    MCV 89 07/21/2025     07/21/2025       CMP  Sodium   Date Value Ref Range Status   07/21/2025 139 136 - 145 mmol/L Final     Potassium   Date Value Ref Range Status   07/21/2025 3.9 3.5 - 5.1 mmol/L Final     Chloride   Date Value Ref Range Status   07/21/2025 107 95 - 110 mmol/L Final     CO2   Date Value Ref Range Status   07/21/2025 25 23 - 29 mmol/L Final     Glucose   Date Value Ref Range Status   07/21/2025 131 (H) 70 - 110 mg/dL Final     BUN   Date Value Ref Range Status   07/21/2025 14 6 - 20 mg/dL Final     Creatinine   Date Value Ref Range Status   07/21/2025 0.8 0.5 - 1.4 mg/dL Final   03/14/2012 0.4 0.2 - 1.4 mg/dl Final     Calcium   Date Value Ref Range Status   07/21/2025 9.3 8.7 - 10.5 mg/dL Final   03/14/2012 8.7 8.6 - 10.2 mg/dl Final     Protein Total   Date Value Ref Range Status    07/21/2025 7.0 6.0 - 8.4 gm/dL Final     Albumin   Date Value Ref Range Status   07/21/2025 4.5 3.5 - 5.2 g/dL Final     Bilirubin Total   Date Value Ref Range Status   07/21/2025 0.4 0.1 - 1.0 mg/dL Final     Comment:     For infants and newborns, interpretation of results should be based   on gestational age, weight and in agreement with clinical   observations.    Premature Infant recommended reference ranges:   0-24 hours:  <8.0 mg/dL   24-48 hours: <12.0 mg/dL   3-5 days:    <15.0 mg/dL   6-29 days:   <15.0 mg/dL     Alkaline Phosphatase   Date Value Ref Range Status   03/14/2012 117 23 - 119 UNIT/L Final     ALP   Date Value Ref Range Status   07/21/2025 42 (L) 55 - 135 unit/L Final     AST   Date Value Ref Range Status   07/21/2025 14 10 - 40 unit/L Final   03/14/2012 44 (H) 10 - 30 UNIT/L Final     ALT   Date Value Ref Range Status   07/21/2025 10 10 - 44 unit/L Final     Anion Gap   Date Value Ref Range Status   07/21/2025 7 (L) 8 - 16 mmol/L Final   03/14/2012 9 5 - 15 meq/L Final     eGFR   Date Value Ref Range Status   07/21/2025 >60 >60 mL/min/1.73/m2 Final   03/14/2025 >60.0 >60 mL/min/1.73 m^2 Final     Lab Results   Component Value Date    IRON 53 07/21/2025    TRANSFERRIN 201 07/21/2025    TIBC 281 07/21/2025    LABIRON 19 (L) 07/21/2025    FESATURATED 34 03/14/2025      Lab Results   Component Value Date    FERRITIN 55.3 07/21/2025     Lab Results   Component Value Date    NZHMAJQP61 363 07/21/2025     Lab Results   Component Value Date    FOLATE >22.3 07/21/2025           Radiology/Diagnostic Studies:        I have reviewed all available lab results and radiology reports.    Assessment/Plan:   (1) 40 y.o. female with diagnosis of fatigue who is a self-referral for evaluation by medical hematology/oncology.       She has seizure disorder for her entire life. She takes OTC iron and B-complex po daily.      Her mom has chronic anemia issues.      She is a primary patient of Peyton De La Cruz NP    "  She is followed by Jennifer Thomson NP with [facility] in [place] and they are planning MRI scans in future. She takes her seizure medicine (keppra) only periodically. She has "absent" seizures which usually only last a couple of mins and occurs every couple of months     She has had breast implants since 2016 with Dr Geno Fleming - she does not have any problems with them. 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     No tobacco and only drinks socially     3/14/2025:  - order CBC/CMP, iron panel, B12/folate  - thyroid panel; hormone panel     4/21/2025:  - she has since started b12 injections monthly  - TSH was a little off at 0.281  - estrogen 123  - FSH 5.5  - LH 3.8    7/24/2025:  - she is on B-complex OTV  - latest CBc with normal WBC, Hgb and plats  - no current anemia  - iron panel adequate     (2) Epilepsy/Seizure disorder     (3) Hx/of ARDS/aspiration pneumonia     (4) Hx/of appendectomy (2023)     (5) DDD     (6) loop recorder - followed by Dr Contreras    (7) thyroid issues NOS    7/24/2025:  - she had telemed with Dr amaro with endocrine in may 2025 but has not heard back from then             VISIT DIAGNOSES:      B12 deficiency (mild)          PLAN:  1. Continue b12 OTC for now; consider resuming B12 injections in future if need be; check labs every 3 months  2. Needs to establish with PCP  3. Check on f/u with endocrine for her thyroid  4. F/u with [facility] as directed  5. F/u with cardiology  6. Enlist      RTC in  3 months  Fax note to Peyton De La Cruz, Jennifer Thomson; Ben ,     Discussion:       I reviewed results of the recently ordered labs, tests and studies; made directives with regards to the results. I have explained all of the above in detail and the patient understands all of the current recommendation(s). I have answered all of their questions to the best of my ability and to their complete satisfaction. The patient " "is to continue with the current management plan.            Electronically signed by Rg Kim MD                   [1]   Current Outpatient Medications:     cyanocobalamin 1,000 mcg/mL injection, Inject 1 mL (1,000 mcg total) into the skin every 30 days., Disp: 3 mL, Rfl: 3    ibuprofen (ADVIL,MOTRIN) 800 MG tablet, Take 1 tablet (800 mg total) by mouth 3 (three) times daily as needed for Pain., Disp: 30 tablet, Rfl: 1    multivitamin (THERAGRAN) per tablet, Take 1 tablet by mouth once daily., Disp: , Rfl:     ondansetron (ZOFRAN) 4 MG tablet, Take 1 tablet (4 mg total) by mouth every 6 (six) hours as needed for Nausea., Disp: 30 tablet, Rfl: 1    syringe with needle, safety (MONOJECT SAFETY SYRINGES) 3 mL 25 gauge x 5/8" Syrg, 1 Syringe by Misc.(Non-Drug; Combo Route) route every 30 days., Disp: 100 each, Rfl: 0    sertraline (ZOLOFT) 50 MG tablet, Take 1 tablet (50 mg total) by mouth once daily. (Patient not taking: Reported on 7/24/2025), Disp: 30 tablet, Rfl: 0    traZODone (DESYREL) 50 MG tablet, Take 50 mg by mouth every evening. (Patient not taking: Reported on 7/24/2025), Disp: , Rfl:     VYVANSE 50 mg capsule, Take 1 capsule (50 mg total) by mouth once daily. (Patient not taking: Reported on 7/24/2025), Disp: 30 capsule, Rfl: 0    "

## 2025-07-24 NOTE — TELEPHONE ENCOUNTER
Dr. Kim request I meet with patient to assist with establishing a PCP.  I spoke with patient and told her I will call the [facility] to inquire who is accepting new patients with her insurance.

## 2025-07-25 ENCOUNTER — TELEPHONE (OUTPATIENT)
Dept: ENDOCRINOLOGY | Facility: CLINIC | Age: 40
End: 2025-07-25
Payer: MEDICAID

## 2025-07-28 ENCOUNTER — SOCIAL WORK (OUTPATIENT)
Dept: HEMATOLOGY/ONCOLOGY | Facility: CLINIC | Age: 40
End: 2025-07-28
Payer: MEDICAID

## 2025-07-28 NOTE — PROGRESS NOTES
Called patient to provide her with Dr. Oneal's information as he is accepting new patients with her insurance.  I left his number and address on her voicemail along with my number should she need additional assistance.

## 2025-07-29 ENCOUNTER — TELEPHONE (OUTPATIENT)
Facility: CLINIC | Age: 40
End: 2025-07-29
Payer: MEDICAID

## 2025-08-27 ENCOUNTER — LAB VISIT (OUTPATIENT)
Dept: LAB | Facility: HOSPITAL | Age: 40
End: 2025-08-27
Attending: STUDENT IN AN ORGANIZED HEALTH CARE EDUCATION/TRAINING PROGRAM
Payer: MEDICAID

## 2025-08-27 DIAGNOSIS — R79.89 ABNORMAL TSH: ICD-10-CM

## 2025-08-27 DIAGNOSIS — R53.83 FATIGUE, UNSPECIFIED TYPE: ICD-10-CM

## 2025-08-27 DIAGNOSIS — Z13.220 SCREENING FOR LIPOID DISORDERS: ICD-10-CM

## 2025-08-27 DIAGNOSIS — Z13.1 SCREENING FOR DIABETES MELLITUS: Primary | ICD-10-CM

## 2025-08-27 LAB
CHOLEST SERPL-MCNC: 241 MG/DL (ref 120–199)
CHOLEST/HDLC SERPL: 5 {RATIO} (ref 2–5)
HDLC SERPL-MCNC: 48 MG/DL (ref 40–75)
HDLC SERPL: 19.9 % (ref 20–50)
LDLC SERPL CALC-MCNC: 179.6 MG/DL (ref 63–159)
NONHDLC SERPL-MCNC: 193 MG/DL
T3FREE SERPL-MCNC: 106 NG/DL (ref 60–180)
T4 FREE SERPL-MCNC: 0.85 NG/DL (ref 0.71–1.51)
TRIGL SERPL-MCNC: 67 MG/DL (ref 30–150)
TSH SERPL-ACNC: 0.2 UIU/ML (ref 0.4–4)

## 2025-08-27 PROCEDURE — 36415 COLL VENOUS BLD VENIPUNCTURE: CPT | Mod: PO

## 2025-08-27 PROCEDURE — 84443 ASSAY THYROID STIM HORMONE: CPT

## 2025-08-27 PROCEDURE — 86376 MICROSOMAL ANTIBODY EACH: CPT

## 2025-08-27 PROCEDURE — 84439 ASSAY OF FREE THYROXINE: CPT

## 2025-08-27 PROCEDURE — 83520 IMMUNOASSAY QUANT NOS NONAB: CPT

## 2025-08-27 PROCEDURE — 84480 ASSAY TRIIODOTHYRONINE (T3): CPT

## 2025-08-27 PROCEDURE — 83036 HEMOGLOBIN GLYCOSYLATED A1C: CPT

## 2025-08-27 PROCEDURE — 80061 LIPID PANEL: CPT

## 2025-08-28 LAB
EAG (OHS): 105 MG/DL (ref 68–131)
HBA1C MFR BLD: 5.3 % (ref 4–5.6)

## 2025-08-29 LAB
THYROPEROXIDASE AB SERPL-ACNC: 1.8 IU/ML
TSH RECEP AB SER-ACNC: <1.1 IU/L (ref 0–1.75)

## (undated) DEVICE — SCISSORS 5MM APPLIED MEDICAL   CB030

## (undated) DEVICE — CUTTER LINEAR FLEX ARTICNG 45MM ATS45

## (undated) DEVICE — NEEDLE INSUFFLATION 150MM PN150

## (undated) DEVICE — RETRIEVER ENDOPOUCH SPEC BAG

## (undated) DEVICE — PAD BOVIE ADULT

## (undated) DEVICE — RELOAD LINEAR TR45W

## (undated) DEVICE — SLEEVE TROCAR ENDOPATH XCEL

## (undated) DEVICE — TRAY GENERAL LAPAROSCOPY

## (undated) DEVICE — TROCAR OPTICAL ZTHREAD 12MMX100MM CTF73

## (undated) DEVICE — SUTURE VICRYL #0 27 UR-6 VCP603H

## (undated) DEVICE — SOLUTION IRRI H2O BOTTLE 1000ML

## (undated) DEVICE — SUTURE MONOCRYL 4-0 PS-2 Y496G

## (undated) DEVICE — Device

## (undated) DEVICE — WARMER SCOPE SEE SHARP

## (undated) DEVICE — TROCAR ENDOPATH XCEL BLADELESS B5LT

## (undated) DEVICE — ADHESIVE TISSUE EXOFIN

## (undated) DEVICE — RELOAD LINEAR 6R45B

## (undated) DEVICE — DISSECTOR CURVED WITH MONOPOLAR 5MM

## (undated) DEVICE — GLOVE BIOGELPI GOLD SIZE 7.5